# Patient Record
Sex: FEMALE | Race: OTHER | Employment: UNEMPLOYED | URBAN - METROPOLITAN AREA
[De-identification: names, ages, dates, MRNs, and addresses within clinical notes are randomized per-mention and may not be internally consistent; named-entity substitution may affect disease eponyms.]

---

## 2017-05-08 LAB
CHLAMYDIA, EXTERNAL: NORMAL
HBSAG, EXTERNAL: NORMAL
HIV, EXTERNAL: NORMAL
N. GONORRHEA, EXTERNAL: NORMAL
RPR, EXTERNAL: NORMAL
RUBELLA, EXTERNAL: NORMAL

## 2017-11-23 LAB — GRBS, EXTERNAL: NORMAL

## 2017-12-10 ENCOUNTER — HOSPITAL ENCOUNTER (INPATIENT)
Age: 27
LOS: 2 days | Discharge: HOME | End: 2017-12-12
Attending: OBSTETRICS & GYNECOLOGY | Admitting: OBSTETRICS & GYNECOLOGY
Payer: COMMERCIAL

## 2017-12-10 ENCOUNTER — ANESTHESIA EVENT (OUTPATIENT)
Dept: LABOR AND DELIVERY | Age: 27
End: 2017-12-10
Payer: COMMERCIAL

## 2017-12-10 ENCOUNTER — ANESTHESIA (OUTPATIENT)
Dept: LABOR AND DELIVERY | Age: 27
End: 2017-12-10
Payer: COMMERCIAL

## 2017-12-10 PROBLEM — Z34.83 SUPERVISION OF NORMAL IUP (INTRAUTERINE PREGNANCY) IN MULTIGRAVIDA, THIRD TRIMESTER: Status: ACTIVE | Noted: 2017-12-10

## 2017-12-10 LAB
ABO + RH BLD: NORMAL
BASOPHILS # BLD: 0 K/UL (ref 0–0.06)
BASOPHILS NFR BLD: 0 % (ref 0–2)
BLOOD GROUP ANTIBODIES SERPL: NORMAL
DIFFERENTIAL METHOD BLD: ABNORMAL
EOSINOPHIL # BLD: 0.1 K/UL (ref 0–0.4)
EOSINOPHIL NFR BLD: 1 % (ref 0–5)
ERYTHROCYTE [DISTWIDTH] IN BLOOD BY AUTOMATED COUNT: 14.3 % (ref 11.6–14.5)
HCT VFR BLD AUTO: 35.5 % (ref 35–45)
HGB BLD-MCNC: 11.4 G/DL (ref 12–16)
LYMPHOCYTES # BLD: 2.7 K/UL (ref 0.9–3.6)
LYMPHOCYTES NFR BLD: 25 % (ref 21–52)
MCH RBC QN AUTO: 25.6 PG (ref 24–34)
MCHC RBC AUTO-ENTMCNC: 32.1 G/DL (ref 31–37)
MCV RBC AUTO: 79.6 FL (ref 74–97)
MONOCYTES # BLD: 0.9 K/UL (ref 0.05–1.2)
MONOCYTES NFR BLD: 8 % (ref 3–10)
NEUTS SEG # BLD: 7 K/UL (ref 1.8–8)
NEUTS SEG NFR BLD: 66 % (ref 40–73)
PLATELET # BLD AUTO: 304 K/UL (ref 135–420)
PMV BLD AUTO: 8.5 FL (ref 9.2–11.8)
RBC # BLD AUTO: 4.46 M/UL (ref 4.2–5.3)
SPECIMEN EXP DATE BLD: NORMAL
WBC # BLD AUTO: 10.8 K/UL (ref 4.6–13.2)

## 2017-12-10 PROCEDURE — 77030034849

## 2017-12-10 PROCEDURE — 65270000029 HC RM PRIVATE

## 2017-12-10 PROCEDURE — 74011000250 HC RX REV CODE- 250

## 2017-12-10 PROCEDURE — 85025 COMPLETE CBC W/AUTO DIFF WBC: CPT

## 2017-12-10 PROCEDURE — 75410000000 HC DELIVERY VAGINAL/SINGLE

## 2017-12-10 PROCEDURE — 36415 COLL VENOUS BLD VENIPUNCTURE: CPT

## 2017-12-10 PROCEDURE — 76060000078 HC EPIDURAL ANESTHESIA

## 2017-12-10 PROCEDURE — 4A1HXCZ MONITORING OF PRODUCTS OF CONCEPTION, CARDIAC RATE, EXTERNAL APPROACH: ICD-10-PCS | Performed by: ADVANCED PRACTICE MIDWIFE

## 2017-12-10 PROCEDURE — 74011250636 HC RX REV CODE- 250/636: Performed by: ANESTHESIOLOGY

## 2017-12-10 PROCEDURE — 00HU33Z INSERTION OF INFUSION DEVICE INTO SPINAL CANAL, PERCUTANEOUS APPROACH: ICD-10-PCS | Performed by: ANESTHESIOLOGY

## 2017-12-10 PROCEDURE — 86900 BLOOD TYPING SEROLOGIC ABO: CPT

## 2017-12-10 PROCEDURE — 77030007879 HC KT SPN EPDRL TELE -B: Performed by: ANESTHESIOLOGY

## 2017-12-10 PROCEDURE — 74011250636 HC RX REV CODE- 250/636

## 2017-12-10 PROCEDURE — 74011250637 HC RX REV CODE- 250/637: Performed by: ADVANCED PRACTICE MIDWIFE

## 2017-12-10 PROCEDURE — 75410000002 HC LABOR FEE PER 1 HR

## 2017-12-10 PROCEDURE — 74011250636 HC RX REV CODE- 250/636: Performed by: ADVANCED PRACTICE MIDWIFE

## 2017-12-10 PROCEDURE — 75410000003 HC RECOV DEL/VAG/CSECN EA 0.5 HR

## 2017-12-10 RX ORDER — FENTANYL CITRATE 50 UG/ML
100 INJECTION, SOLUTION INTRAMUSCULAR; INTRAVENOUS ONCE
Status: COMPLETED | OUTPATIENT
Start: 2017-12-10 | End: 2017-12-10

## 2017-12-10 RX ORDER — SODIUM CHLORIDE 0.9 % (FLUSH) 0.9 %
5-10 SYRINGE (ML) INJECTION EVERY 8 HOURS
Status: DISCONTINUED | OUTPATIENT
Start: 2017-12-10 | End: 2017-12-10 | Stop reason: HOSPADM

## 2017-12-10 RX ORDER — BUTORPHANOL TARTRATE 2 MG/ML
2 INJECTION INTRAMUSCULAR; INTRAVENOUS
Status: DISCONTINUED | OUTPATIENT
Start: 2017-12-10 | End: 2017-12-10 | Stop reason: HOSPADM

## 2017-12-10 RX ORDER — NALBUPHINE HYDROCHLORIDE 10 MG/ML
10 INJECTION, SOLUTION INTRAMUSCULAR; INTRAVENOUS; SUBCUTANEOUS
Status: DISCONTINUED | OUTPATIENT
Start: 2017-12-10 | End: 2017-12-10 | Stop reason: HOSPADM

## 2017-12-10 RX ORDER — SODIUM CHLORIDE 0.9 % (FLUSH) 0.9 %
5-10 SYRINGE (ML) INJECTION AS NEEDED
Status: DISCONTINUED | OUTPATIENT
Start: 2017-12-10 | End: 2017-12-10 | Stop reason: HOSPADM

## 2017-12-10 RX ORDER — AMOXICILLIN 250 MG
1 CAPSULE ORAL
Status: DISCONTINUED | OUTPATIENT
Start: 2017-12-10 | End: 2017-12-12 | Stop reason: HOSPADM

## 2017-12-10 RX ORDER — OXYTOCIN/RINGER'S LACTATE 20/1000 ML
500 PLASTIC BAG, INJECTION (ML) INTRAVENOUS ONCE
Status: COMPLETED | OUTPATIENT
Start: 2017-12-10 | End: 2017-12-10

## 2017-12-10 RX ORDER — SODIUM CHLORIDE, SODIUM LACTATE, POTASSIUM CHLORIDE, CALCIUM CHLORIDE 600; 310; 30; 20 MG/100ML; MG/100ML; MG/100ML; MG/100ML
125 INJECTION, SOLUTION INTRAVENOUS CONTINUOUS
Status: DISCONTINUED | OUTPATIENT
Start: 2017-12-10 | End: 2017-12-10 | Stop reason: HOSPADM

## 2017-12-10 RX ORDER — EPHEDRINE SULFATE/0.9% NACL/PF 25 MG/5 ML
10 SYRINGE (ML) INTRAVENOUS AS NEEDED
Status: DISCONTINUED | OUTPATIENT
Start: 2017-12-10 | End: 2017-12-10 | Stop reason: HOSPADM

## 2017-12-10 RX ORDER — LIDOCAINE HYDROCHLORIDE 10 MG/ML
20 INJECTION, SOLUTION EPIDURAL; INFILTRATION; INTRACAUDAL; PERINEURAL AS NEEDED
Status: DISCONTINUED | OUTPATIENT
Start: 2017-12-10 | End: 2017-12-10 | Stop reason: HOSPADM

## 2017-12-10 RX ORDER — HYDROMORPHONE HYDROCHLORIDE 2 MG/ML
1 INJECTION, SOLUTION INTRAMUSCULAR; INTRAVENOUS; SUBCUTANEOUS
Status: DISCONTINUED | OUTPATIENT
Start: 2017-12-10 | End: 2017-12-10 | Stop reason: HOSPADM

## 2017-12-10 RX ORDER — ZOLPIDEM TARTRATE 5 MG/1
5 TABLET ORAL
Status: DISCONTINUED | OUTPATIENT
Start: 2017-12-10 | End: 2017-12-12 | Stop reason: HOSPADM

## 2017-12-10 RX ORDER — ACETAMINOPHEN 325 MG/1
650 TABLET ORAL
Status: DISCONTINUED | OUTPATIENT
Start: 2017-12-10 | End: 2017-12-12 | Stop reason: HOSPADM

## 2017-12-10 RX ORDER — FENTANYL/ROPIVACAINE/NS/PF 2MCG/ML-.1
1-15 PLASTIC BAG, INJECTION (ML) EPIDURAL
Status: DISCONTINUED | OUTPATIENT
Start: 2017-12-10 | End: 2017-12-10 | Stop reason: HOSPADM

## 2017-12-10 RX ORDER — TERBUTALINE SULFATE 1 MG/ML
0.25 INJECTION SUBCUTANEOUS
Status: DISCONTINUED | OUTPATIENT
Start: 2017-12-10 | End: 2017-12-10 | Stop reason: HOSPADM

## 2017-12-10 RX ORDER — OXYCODONE AND ACETAMINOPHEN 5; 325 MG/1; MG/1
2 TABLET ORAL
Status: DISCONTINUED | OUTPATIENT
Start: 2017-12-10 | End: 2017-12-12 | Stop reason: HOSPADM

## 2017-12-10 RX ORDER — LIDOCAINE HYDROCHLORIDE 10 MG/ML
INJECTION INFILTRATION; PERINEURAL
Status: DISPENSED
Start: 2017-12-10 | End: 2017-12-11

## 2017-12-10 RX ORDER — NALOXONE HYDROCHLORIDE 0.4 MG/ML
0.2 INJECTION, SOLUTION INTRAMUSCULAR; INTRAVENOUS; SUBCUTANEOUS AS NEEDED
Status: DISCONTINUED | OUTPATIENT
Start: 2017-12-10 | End: 2017-12-10 | Stop reason: HOSPADM

## 2017-12-10 RX ORDER — ROPIVACAINE HYDROCHLORIDE 2 MG/ML
INJECTION, SOLUTION EPIDURAL; INFILTRATION; PERINEURAL AS NEEDED
Status: DISCONTINUED | OUTPATIENT
Start: 2017-12-10 | End: 2017-12-10 | Stop reason: HOSPADM

## 2017-12-10 RX ORDER — OXYTOCIN/RINGER'S LACTATE 20/1000 ML
125 PLASTIC BAG, INJECTION (ML) INTRAVENOUS CONTINUOUS
Status: DISCONTINUED | OUTPATIENT
Start: 2017-12-10 | End: 2017-12-10 | Stop reason: HOSPADM

## 2017-12-10 RX ORDER — METHYLERGONOVINE MALEATE 0.2 MG/ML
0.2 INJECTION INTRAVENOUS AS NEEDED
Status: DISCONTINUED | OUTPATIENT
Start: 2017-12-10 | End: 2017-12-10 | Stop reason: HOSPADM

## 2017-12-10 RX ORDER — MINERAL OIL
30 OIL (ML) ORAL AS NEEDED
Status: DISCONTINUED | OUTPATIENT
Start: 2017-12-10 | End: 2017-12-10 | Stop reason: HOSPADM

## 2017-12-10 RX ORDER — DIPHENHYDRAMINE HYDROCHLORIDE 50 MG/ML
25 INJECTION, SOLUTION INTRAMUSCULAR; INTRAVENOUS
Status: DISCONTINUED | OUTPATIENT
Start: 2017-12-10 | End: 2017-12-10 | Stop reason: HOSPADM

## 2017-12-10 RX ORDER — LIDOCAINE HYDROCHLORIDE AND EPINEPHRINE 15; 5 MG/ML; UG/ML
INJECTION, SOLUTION EPIDURAL AS NEEDED
Status: DISCONTINUED | OUTPATIENT
Start: 2017-12-10 | End: 2017-12-10 | Stop reason: HOSPADM

## 2017-12-10 RX ORDER — PROMETHAZINE HYDROCHLORIDE 25 MG/ML
25 INJECTION, SOLUTION INTRAMUSCULAR; INTRAVENOUS
Status: DISCONTINUED | OUTPATIENT
Start: 2017-12-10 | End: 2017-12-12 | Stop reason: HOSPADM

## 2017-12-10 RX ORDER — FENTANYL CITRATE 50 UG/ML
INJECTION, SOLUTION INTRAMUSCULAR; INTRAVENOUS AS NEEDED
Status: DISCONTINUED | OUTPATIENT
Start: 2017-12-10 | End: 2017-12-10 | Stop reason: HOSPADM

## 2017-12-10 RX ADMIN — Medication 10000 MILLI-UNITS/HR: at 17:24

## 2017-12-10 RX ADMIN — ROPIVACAINE HYDROCHLORIDE 3 ML: 2 INJECTION, SOLUTION EPIDURAL; INFILTRATION; PERINEURAL at 12:53

## 2017-12-10 RX ADMIN — ACETAMINOPHEN 650 MG: 325 TABLET ORAL at 19:40

## 2017-12-10 RX ADMIN — FENTANYL CITRATE 100 MCG: 50 INJECTION, SOLUTION INTRAMUSCULAR; INTRAVENOUS at 12:00

## 2017-12-10 RX ADMIN — SODIUM CHLORIDE, SODIUM LACTATE, POTASSIUM CHLORIDE, AND CALCIUM CHLORIDE 125 ML/HR: 600; 310; 30; 20 INJECTION, SOLUTION INTRAVENOUS at 12:34

## 2017-12-10 RX ADMIN — ROPIVACAINE HYDROCHLORIDE 10 ML/HR: 10 INJECTION, SOLUTION EPIDURAL at 12:28

## 2017-12-10 RX ADMIN — ROPIVACAINE HYDROCHLORIDE 3 ML: 2 INJECTION, SOLUTION EPIDURAL; INFILTRATION; PERINEURAL at 12:52

## 2017-12-10 RX ADMIN — SODIUM CHLORIDE, SODIUM LACTATE, POTASSIUM CHLORIDE, AND CALCIUM CHLORIDE 125 ML/HR: 600; 310; 30; 20 INJECTION, SOLUTION INTRAVENOUS at 10:20

## 2017-12-10 RX ADMIN — LIDOCAINE HYDROCHLORIDE AND EPINEPHRINE 3 ML: 15; 5 INJECTION, SOLUTION EPIDURAL at 12:05

## 2017-12-10 RX ADMIN — ROPIVACAINE HYDROCHLORIDE 3 ML: 2 INJECTION, SOLUTION EPIDURAL; INFILTRATION; PERINEURAL at 12:08

## 2017-12-10 RX ADMIN — Medication 125 ML/HR: at 18:54

## 2017-12-10 RX ADMIN — ROPIVACAINE HYDROCHLORIDE 4 ML: 2 INJECTION, SOLUTION EPIDURAL; INFILTRATION; PERINEURAL at 12:09

## 2017-12-10 RX ADMIN — ROPIVACAINE HYDROCHLORIDE 3 ML: 2 INJECTION, SOLUTION EPIDURAL; INFILTRATION; PERINEURAL at 12:07

## 2017-12-10 RX ADMIN — FENTANYL CITRATE 100 MCG: 50 INJECTION, SOLUTION INTRAMUSCULAR; INTRAVENOUS at 12:09

## 2017-12-10 NOTE — L&D DELIVERY NOTE
Delivery Note    Obstetrician:  Marisabel Carvajal CNM    Assistant: none    Pre-Delivery Diagnosis: Term pregnancy, Spontaneous labor or Single fetus    Post-Delivery Diagnosis: Living  infant(s) or Female    Intrapartum Event: None    Procedure: Spontaneous vaginal delivery    Epidural: YES    Monitor:  Fetal Heart Tones - External and Uterine Contractions - External    Indications for instrumental delivery: none    Estimated Blood Loss: 150    Episiotomy: none    Laceration(s):  none    Laceration(s) repair: NO    Presentation: Cephalic    Fetal Description: coyne    Fetal Position: Occiput Anterior    Birth Weight: not yet assessed    Birth Length: not yet assessed    Apgar - One Minute: 9    Apgar - Five Minutes: 9    Umbilical Cord: 3 vessels present and Cord blood sent to lab for type, Rh, and Nini' test    Specimens: placenta delivered intact           Complications:  none           Cord Blood Results:   Information for the patient's :  Vida Trevino [[id number]]   No results found for: PCTABR, PCTDIG, BILI, ABORH    Prenatal Labs:     Lab Results   Component Value Date/Time    ABO/Rh(D) O POSITIVE 12/10/2017 10:15 AM    HBsAg, External neg 2017    HIV, External NR 2017    Rubella, External immune 2017    RPR, External NR 2017    Gonorrhea, External neg 2017    Chlamydia, External neg 2017    GrBStrep, External neg 2017        Attending Attestation: I was present and scrubbed for the entire procedure    Signed By:  Marisabel Carvajal CNM     December 10, 2017

## 2017-12-10 NOTE — ANESTHESIA PROCEDURE NOTES
Epidural Block    Start time: 12/10/2017 11:46 AM  Performed by: Lara Claros by: Simone Awad     Pre-Procedure  Indication: labor epidural    Preanesthetic Checklist: patient identified, risks and benefits discussed, anesthesia consent, site marked, patient being monitored, timeout performed and anesthesia consent    Timeout Time: 11:51        Epidural:   Patient position:  Seated  Prep region:  Lumbar  Prep: Chlorhexidine    Location:  L4-5    Needle and Epidural Catheter:   Needle Type:  Tuohy  Needle Gauge:  17 G  Injection Technique:  Loss of resistance using saline  Attempts:  1  Catheter Size:  20 G  Catheter at Skin Depth (cm):  12  Depth in Epidural Space (cm):  7  Events: no blood with aspiration, no cerebrospinal fluid with aspiration, no paresthesia and negative aspiration test    Test Dose:  Negative and lidocaine 1.5% w/ epi    Assessment:   Catheter Secured:  Tegaderm and tape  Insertion:  Uncomplicated  Patient tolerance:  Patient tolerated the procedure well with no immediate complications  While pt sitting and prepped for epidural, PIV noted to be infiltrated. Procedure paused for placement on new PIV. Pt re-prepped/draped.

## 2017-12-10 NOTE — IP AVS SNAPSHOT
[address] [address] 
[phone] Patient: Terrence Watt MRN: [id number] :1990 About your hospitalization You were admitted on:  December 10, 2017 You last received care in the:  [address] [place] [address] You were discharged on:  2017 Why you were hospitalized Your primary diagnosis was:  Not on File Your diagnoses also included:  Supervision Of Normal Iup (Intrauterine Pregnancy) In Multigravida, Third Trimester Things You Need To Do (next 8 weeks) Follow up with Candace Whitman MD  
  
Phone:  [phone] Where:  [address], [address] [facility], [address] [place] [address] Follow up with Dave Rossi MD in 6 week(s) As needed for postpartum visit. Phone:  [phone] Where:  [facility], [facility], [facility] [place] [id number] Discharge Orders None A check debra indicates which time of day the medication should be taken. My Medications STOP taking these medications HYDROcodone-acetaminophen 5-325 mg per tablet Commonly known as:  [address] these medications as instructed Instructions Each Dose to Equal  
 Morning Noon Evening Bedtime  
 oxyCODONE-acetaminophen 5-325 mg per tablet Commonly known as:  PERCOCET Your last dose was: Your next dose is: Take 1 Tab by mouth every six (6) hours as needed. Max Daily Amount: 4 Tabs. Indications: Pain 1 Tab PNV with Ca No.65-Iron Poly-FA 60 mg iron-1 mg Cap Your last dose was: Your next dose is: Take 1 Tab by mouth daily. Indications: PREGNANCY  
 1 Tab Where to Get Your Medications Information on where to get these meds will be given to you by the nurse or doctor. ! Ask your nurse or doctor about these medications oxyCODONE-acetaminophen 5-325 mg per tablet Discharge Instructions POST DELIVERY DISCHARGE INSTRUCTIONS Name: Roxianne Moritz YOB: 1990 Primary Diagnosis: Active Problems: * No active hospital problems. * General:  
 
Diet/Diet Restrictions: 
Eight 8-ounce glasses of fluid daily (water, juices); avoid excessive caffeine intake. Meals/snacks as desired which are high in fiber and carbohydrates and low in fat and cholesterol. Physical Activity / Restrictions / Safety:  
 
Avoid heavy lifting, no more that 8 lbs. For 2-3 weeks; limit use of stairs to 2 times daily for the first week home. No driving for one week. Avoid intercourse 4-6 weeks, no douching or tampon use. Check with obstetrician before starting or resuming an exercise program.    
 
 
Discharge Instructions/Special Treatment/Home Care Needs:  
 
Continue prenatal vitamins. Continue to use squirt bottle with warm water on your episiotomy after each bathroom use until bleeding stops. If steri-strips applied to your incision, remove in 7-10 days. Call your doctor for the following:  
 
Fever over 101 degrees by mouth. Vaginal bleeding heavier than a normal menstrual period or clot larger than a golf ball. Red streaks or increased swelling of legs, painful red streaks on your breast. 
Painful urination, constipation and increased pain or swelling or discharge with your incision. If you feel extremely anxious or overwhelmed. If you have thoughts of harming yourself and/or your baby. Pain Management:  
 
Pain Management:  
Take Acetaminophen (Tylenol) or Ibuprofen (Advil, Motrin), as directed for pain. Use a warm Sitz bath 3 times daily to relieve episiotomy or hemorrhoidal discomfort. Heating pad to  incision as needed. For hemorrhoidal discomfort, use Tucks and Anusol cream as needed and directed. Follow-Up Care: These are general instructions for a healthy lifestyle: No smoking/ No tobacco products/ Avoid exposure to second hand smoke Surgeon General's Warning:  Quitting smoking now greatly reduces serious risk to your health. Obesity, smoking, and sedentary lifestyle greatly increases your risk for illness A healthy diet, regular physical exercise & weight monitoring are important for maintaining a healthy lifestyle Recognize signs and symptoms of STROKE: 
 
F-face looks uneven A-arms unable to move or move unevenly S-speech slurred or non-existent T-time-call 911 as soon as signs and symptoms begin-DO NOT go Back to bed or wait to see if you get better-TIME IS BRAIN. MyChart Activation Thank you for requesting access to [employer]. Please follow the instructions below to securely access and download your online medical record. [employer] allows you to send messages to your doctor, view your test results, renew your prescriptions, schedule appointments, and more. How Do I Sign Up? 1. In your internet browser, go to [email or web address]. 2. Click on the First Time User? Click Here link in the Sign In box. You will see the New Member Sign Up page. 3. Enter your [employer] Access Code exactly as it appears below. You will not need to use this code after youve completed the sign-up process. If you do not sign up before the expiration date, you must request a new code. [employer] Access Code: [email or web address] Expires: 3/6/2018  1:27 PM (This is the date your [employer] access code will ) 4. Enter the last four digits of your Social Security Number (xxxx) and Date of Birth (mm/dd/yyyy) as indicated and click Submit. You will be taken to the next sign-up page. 5. Create a [employer] ID. This will be your [employer] login ID and cannot be changed, so think of one that is secure and easy to remember. 6. Create a [employer] password. You can change your password at any time. 7. Enter your Password Reset Question and Answer. This can be used at a later time if you forget your password. 8. Enter your e-mail address. You will receive e-mail notification when new information is available in [address] 9. Click Sign Up. You can now view and download portions of your medical record. 10. Click the Download Summary menu link to download a portable copy of your medical information. Additional Information If you have questions, please visit the Frequently Asked Questions section of the [employer] website at [email or web address] Remember, [employer] is NOT to be used for urgent needs. For medical emergencies, dial 911. Patient armband removed and given to patient to take home. Patient was informed of the privacy risks if armband lost or stolen Signed By: Mark Pink RN                                                                                                   Date: 12/12/2017 Time: 9:48 AM 
 
 
 
  
  
  
Introducing \[facility]\"" & HEALTH SERVICES! [facility] [facility] introduces [employer] patient portal. Now you can access parts of your medical record, email your doctor's office, and request medication refills online. 1. In your internet browser, go to [email or web address] 2. Click on the First Time User? Click Here link in the Sign In box. You will see the New Member Sign Up page. 3. Enter your [employer] Access Code exactly as it appears below. You will not need to use this code after youve completed the sign-up process. If you do not sign up before the expiration date, you must request a new code. · [employer] Access Code: [email or web address] Expires: 3/6/2018  1:27 PM 
 
4. Enter the last four digits of your Social Security Number (xxxx) and Date of Birth (mm/dd/yyyy) as indicated and click Submit. You will be taken to the next sign-up page. 5. Create a [employer] ID.  This will be your [employer] login ID and cannot be changed, so think of one that is secure and easy to remember. 6. Create a [employer] password. You can change your password at any time. 7. Enter your Password Reset Question and Answer. This can be used at a later time if you forget your password. 8. Enter your e-mail address. You will receive e-mail notification when new information is available in [address] 9. Click Sign Up. You can now view and download portions of your medical record. 10. Click the Download Summary menu link to download a portable copy of your medical information. If you have questions, please visit the Frequently Asked Questions section of the [employer] website. Remember, [employer] is NOT to be used for urgent needs. For medical emergencies, dial 911. Now available from your iPhone and Android! Providers Seen During Your Hospitalization Provider Specialty Primary office phone Irma Aiken MD Obstetrics & Gynecology [phone] Your Primary Care Physician (PCP) Primary Care Physician Office Phone Office Fax Marlene Dallas [phone] You are allergic to the following Allergen Reactions Ibuprofen Swelling Recent Documentation Height Weight Breastfeeding? BMI OB Status Smoking Status 1.575 m 87.1 kg Unknown 35.12 kg/m2 Recent pregnancy Never Smoker Emergency Contacts Name Discharge Info Relation Home Work Mobile Maya Jaurez DISCHARGE CAREGIVER [3] Parent [1] [phone] Rashawn Bradshaw DISCHARGE CAREGIVER [3] Spouse [3]   [phone] Patient Belongings The following personal items are in your possession at time of discharge: 
  Dental Appliances: None         Home Medications: None   Jewelry: Necklace, Earrings  Clothing: At bedside    Other Valuables: None Please provide this summary of care documentation to your next provider.  
  
  
 
  
Signatures-by signing, you are acknowledging that this After Visit Summary has been reviewed with you and you have received a copy. Patient Signature:  ____________________________________________________________ Date:  ____________________________________________________________  
  
Faith Feller Provider Signature:  ____________________________________________________________ Date:  ____________________________________________________________

## 2017-12-10 NOTE — IP AVS SNAPSHOT
[address] [address] 
[phone] Patient: oMira Gamboa MRN: [id number] :1990 My Medications STOP taking these medications HYDROcodone-acetaminophen 5-325 mg per tablet Commonly known as:  [address] these medications as instructed Instructions Each Dose to Equal  
 Morning Noon Evening Bedtime  
 oxyCODONE-acetaminophen 5-325 mg per tablet Commonly known as:  PERCOCET Your last dose was: Your next dose is: Take 1 Tab by mouth every six (6) hours as needed. Max Daily Amount: 4 Tabs. Indications: Pain 1 Tab PNV with Ca No.65-Iron Poly-FA 60 mg iron-1 mg Cap Your last dose was: Your next dose is: Take 1 Tab by mouth daily. Indications: PREGNANCY  
 1 Tab Where to Get Your Medications Information on where to get these meds will be given to you by the nurse or doctor. ! Ask your nurse or doctor about these medications  
  oxyCODONE-acetaminophen 5-325 mg per tablet

## 2017-12-10 NOTE — ANESTHESIA PREPROCEDURE EVALUATION
Anesthetic History   No history of anesthetic complications            Review of Systems / Medical History  Patient summary reviewed, nursing notes reviewed and pertinent labs reviewed    Pulmonary  Within defined limits                 Neuro/Psych   Within defined limits           Cardiovascular  Within defined limits                Exercise tolerance: >4 METS     GI/Hepatic/Renal  Within defined limits              Endo/Other  Within defined limits           Other Findings              Physical Exam    Airway  Mallampati: II  TM Distance: 4 - 6 cm  Neck ROM: normal range of motion   Mouth opening: Normal     Cardiovascular               Dental         Pulmonary                 Abdominal         Other Findings            Anesthetic Plan    ASA: 2  Anesthesia type: epidural            Anesthetic plan and risks discussed with: Patient      Risks of epidural, including but not limited to bleeding, infection, back pain, headache, seizure, nerve injury, and block failure discussed with patient and accepted.

## 2017-12-10 NOTE — PROGRESS NOTES
0930 M. EDITH Martinez at bedside. Nitrazine positive. SVE:5/80/-2    [id number] Pt reports she felt a gush of fluid at 0745 this morning. Cydney every 5-7 minutes. 1020 Pt admitted to room 1.     1030 Pt oriented to room. Given call leary. [address] [place] Elder Pérez paged. 1120 Dr. Juan Francisco Pérez returned call. 1150 Dr. Juan Francisco Pérez at bedside explaining epidural procedure, side effects, risks, benefits, and positioning. Patient verbalizes understanding. Time-out: 1151  Procedure start: 1153  Catheter in, needle out: 1203  Test dose: 1205  Fentanyl vial handed to MD at bedside. Loading dose: 5232  Patient connected to pump: 1228    1152 IV infiltrated with bolus running. [address] [address] Second IV infiltrated with bolus running    1234 New IV started. Running well with maintains fluids. Pt reported no pain relief on left side. Pt tilted and pt bolus administered. 1240 Reports still no pain relief on left side. [address] [facility] [address]. Juan Francisco Pérez paged    [phone] Dr. Juan Francisco Pérez returned call. Asked to call CRNA to bolus pt. CRNA paged. 1250 CRNA at bedside. Bolus given. 1500 [facility] EDITH Martinez SVE:8/100/0    1300 [place] placed. Pt turned to left side. US and TOCO adjusted. [address] [facility] [address] lip/100/+1    1434 M. EDITH Martinez at bedside. Complete. Pt turned to right lateral with peanutball in place. US and TOCO adjusted. 1520 Positioned in chair position. US and TOCO adjusted. 1545 Pt tilted to left side in chair position. [address] and CNM at bedside monitoring [address] until delivery. 1720 start pushing    1720 Delivery of viable female infant. Vigorous cry noted with tactile stimulation. Grandmother cut cord. Infant placed skin to skin on mothers chest.     1729 Delivery of intact placenta. 1745 Ileana-care, Ice-pack, and pad provided. Pt breast feeding infant. [address] provided. Pt denies needs at this time. 1900 Pad and ice-pack changed. 1920 Bedside shift change report given to MOOSE Bates (oncoming nurse) by Deysi Del Rio RN  (offgoing nurse). Report included the following information SBAR, Kardex, Intake/Output and MAR.

## 2017-12-11 PROBLEM — Z34.83 SUPERVISION OF NORMAL IUP (INTRAUTERINE PREGNANCY) IN MULTIGRAVIDA, THIRD TRIMESTER: Status: RESOLVED | Noted: 2017-12-10 | Resolved: 2017-12-11

## 2017-12-11 LAB
HCT VFR BLD AUTO: 33 % (ref 35–45)
HGB BLD-MCNC: 10.9 G/DL (ref 12–16)

## 2017-12-11 PROCEDURE — 74011250637 HC RX REV CODE- 250/637: Performed by: ADVANCED PRACTICE MIDWIFE

## 2017-12-11 PROCEDURE — 85018 HEMOGLOBIN: CPT | Performed by: OBSTETRICS & GYNECOLOGY

## 2017-12-11 PROCEDURE — 36415 COLL VENOUS BLD VENIPUNCTURE: CPT | Performed by: OBSTETRICS & GYNECOLOGY

## 2017-12-11 PROCEDURE — 85014 HEMATOCRIT: CPT | Performed by: OBSTETRICS & GYNECOLOGY

## 2017-12-11 PROCEDURE — 65270000029 HC RM PRIVATE

## 2017-12-11 RX ORDER — OXYCODONE AND ACETAMINOPHEN 5; 325 MG/1; MG/1
1 TABLET ORAL
Qty: 20 TAB | Refills: 0 | Status: SHIPPED | OUTPATIENT
Start: 2017-12-11

## 2017-12-11 RX ADMIN — OXYCODONE HYDROCHLORIDE AND ACETAMINOPHEN 2 TABLET: 5; 325 TABLET ORAL at 05:18

## 2017-12-11 RX ADMIN — ACETAMINOPHEN 650 MG: 325 TABLET ORAL at 15:17

## 2017-12-11 RX ADMIN — DOCUSATE SODIUM AND SENNOSIDES 1 TABLET: 8.6; 5 TABLET, FILM COATED ORAL at 21:02

## 2017-12-11 NOTE — ANESTHESIA POSTPROCEDURE EVALUATION
12/11/2017  8:44 AM    Laboring Epidural Follow-up Note     Referring physician: Nevaeh Becerra MD   Patient status post vaginal delivery with labor epidural    Visit Vitals    /77 (BP 1 Location: Right arm, BP Patient Position: At rest)    Pulse 71    Temp 37 °C (98.6 °F)    Resp 16    Ht 5' 2\" (1.575 m)    Wt 87.1 kg (192 lb)    SpO2 99%    Breastfeeding Unknown    BMI 35.12 kg/m2       Epidural removed by L&D staff  No sedation, pruritis noted. Adequate analgesia.   No obvious anesthesia complications          Madeline Mukherjee CRNA

## 2017-12-11 NOTE — PROGRESS NOTES
Post-Partum Day Number 1 Progress Note    Judy Pound     Assessment: Spontaneous Vaginal delivery,Doing well, post partum day 1    Plan:  1. Continue routine postpartum and perineal care as well as maternal education. 2. Support maternal/infant bonding  3. Encourage advance in diet and ambulation as tolerated. 4. Plan for discharge home tomorrow. Information for the patient's :  Jose Linton [[id number]]   Vaginal, Spontaneous Delivery   Patient doing well without significant complaint.VSS. Afebrile. She is bottle-feeding and bonding well with infant. She is  Voiding and ambulating without difficulty, reports normal lochia, no clots. She is receiving adequate pain control with current prescribed medication. Current Facility-Administered Medications   Medication Dose Route Frequency       Vitals:  Visit Vitals    /77 (BP 1 Location: Right arm, BP Patient Position: At rest)    Pulse 71    Temp 98.6 °F (37 °C)    Resp 16    Ht 5' 2\" (1.575 m)    Wt 87.1 kg (192 lb)    SpO2 99%    Breastfeeding Unknown    BMI 35.12 kg/m2     Temp (24hrs), Av °F (37.2 °C), Min:98.3 °F (36.8 °C), Max:100.2 °F (37.9 °C)        Exam:   Patient without distress. Abdomen soft, nontender, fundus firm at umbilicus. Perineum with normal lochia noted, no clots. Lower extremities are negative for swelling, cords or tenderness. No signs or symptoms of DVT on PE    Labs: All labs reviewed from last 24 hours.

## 2017-12-11 NOTE — PROGRESS NOTES
1915 - Bedside report received from [facility] - Assessment complete. 2015 - Patient ambulated to bathroom with minimal assistance. Voided large amount. Ileana care performed, pad and gown changed. Patient assisted back to bed. Tolerated well. 2050 - Patient transferred to postpartum unit. Report given to ROSEANNE Matta RN for continued progression of care.

## 2017-12-11 NOTE — ROUTINE PROCESS
Bedside and Verbal shift change report given to BARBARA Silverio RN  by Shadi Sweet RN . Report given with Elton WALLER and MAR.

## 2017-12-11 NOTE — DISCHARGE SUMMARY
Obstetrical Discharge Summary     Name: Ok Bloch MRN: [id number]  SSN: [id number]    YOB: 1990  Age: 33 y.o. Sex: female      Admit Date: 12/10/2017    Discharge Date: 2017    Admitting Physician: Artemio Vanessa MD     Attending Physician:  Artemio Vanessa MD     Discharge Diagnoses:   Information for the patient's :  Marychuy Novoa [[id number]]   Delivery of a 3.204 kg female infant via Vaginal, Spontaneous Delivery on 12/10/2017 at 5:20 PM  by . Apgars were 9 and 9. Additional Diagnoses:   Problem List as of 2017  Date Reviewed: 2017          Codes Class Noted - Resolved    Postpartum care following vaginal delivery ICD-10-CM: Z39.2  ICD-9-CM: V24.2  2015 - Present        RESOLVED: Supervision of normal IUP (intrauterine pregnancy) in multigravida, third trimester ICD-10-CM: Z34.83  ICD-9-CM: V22.1  12/10/2017 - 2017        RESOLVED: IUP (intrauterine pregnancy), incidental ICD-10-CM: Z34.90  ICD-9-CM: V22.2  2015 - 2015        RESOLVED: Supervision of other normal pregnancy (Chronic) ICD-10-CM: Z34.80  ICD-9-CM: V22.1  2015 - 2015              Lab Results   Component Value Date/Time    Rubella, External immune 2017    GrBStrep, External neg 2017     Recent Labs      17   0420   HGB  10.9*       Hospital Course: Normal hospital course following the delivery. Patient Instructions:   Current Discharge Medication List      START taking these medications    Details   oxyCODONE-acetaminophen (PERCOCET) 5-325 mg per tablet Take 1 Tab by mouth every six (6) hours as needed. Max Daily Amount: 4 Tabs. Indications: Pain  Qty: 20 Tab, Refills: 0         CONTINUE these medications which have NOT CHANGED    Details   PNV with Ca No.65-Iron Poly-FA 60 mg iron-1 mg cap Take 1 Tab by mouth daily.  Indications: PREGNANCY         STOP taking these medications       HYDROcodone-acetaminophen (NORCO) 5-325 mg per tablet Comments:   Reason for Stopping:               Reference my discharge instructions. Follow-up Appointments   Procedures    FOLLOW UP VISIT Appointment in: 6 Weeks Follow-up in office in 6 weeks for postpartum visit. Follow-up in office in 6 weeks for postpartum visit.      Standing Status:   Standing     Number of Occurrences:   1     Order Specific Question:   Appointment in     Answer:   6 Weeks        Signed By:  Shaji Medina CNM     December 11, 2017                       BST

## 2017-12-11 NOTE — PROGRESS NOTES
Bedside and Verbal shift change report given to Turner Navarro RN (oncoming nurse) by ROSEANNE Matta RN (offgoing nurse). Report included the following information SBAR, Kardex, Intake/Output, MAR and Recent Results.

## 2017-12-12 VITALS
TEMPERATURE: 98.3 F | HEART RATE: 64 BPM | DIASTOLIC BLOOD PRESSURE: 78 MMHG | OXYGEN SATURATION: 99 % | WEIGHT: 192 LBS | SYSTOLIC BLOOD PRESSURE: 132 MMHG | HEIGHT: 62 IN | BODY MASS INDEX: 35.33 KG/M2 | RESPIRATION RATE: 16 BRPM

## 2017-12-12 PROCEDURE — 74011250637 HC RX REV CODE- 250/637: Performed by: ADVANCED PRACTICE MIDWIFE

## 2017-12-12 RX ADMIN — ACETAMINOPHEN 650 MG: 325 TABLET ORAL at 03:17

## 2017-12-12 RX ADMIN — ACETAMINOPHEN 650 MG: 325 TABLET ORAL at 07:47

## 2017-12-12 NOTE — DISCHARGE INSTRUCTIONS
POST DELIVERY DISCHARGE INSTRUCTIONS    Name: Armand Sandy  YOB: 1990  Primary Diagnosis: Active Problems:    * No active hospital problems. *      General:     Diet/Diet Restrictions:  Eight 8-ounce glasses of fluid daily (water, juices); avoid excessive caffeine intake. Meals/snacks as desired which are high in fiber and carbohydrates and low in fat and cholesterol. Physical Activity / Restrictions / Safety:     Avoid heavy lifting, no more that 8 lbs. For 2-3 weeks; limit use of stairs to 2 times daily for the first week home. No driving for one week. Avoid intercourse 4-6 weeks, no douching or tampon use. Check with obstetrician before starting or resuming an exercise program.         Discharge Instructions/Special Treatment/Home Care Needs:     Continue prenatal vitamins. Continue to use squirt bottle with warm water on your episiotomy after each bathroom use until bleeding stops. If steri-strips applied to your incision, remove in 7-10 days. Call your doctor for the following:     Fever over 101 degrees by mouth. Vaginal bleeding heavier than a normal menstrual period or clot larger than a golf ball. Red streaks or increased swelling of legs, painful red streaks on your breast.  Painful urination, constipation and increased pain or swelling or discharge with your incision. If you feel extremely anxious or overwhelmed. If you have thoughts of harming yourself and/or your baby. Pain Management:     Pain Management:   Take Acetaminophen (Tylenol) or Ibuprofen (Advil, Motrin), as directed for pain. Use a warm Sitz bath 3 times daily to relieve episiotomy or hemorrhoidal discomfort. Heating pad to  incision as needed. For hemorrhoidal discomfort, use Tucks and Anusol cream as needed and directed. Follow-Up Care:      These are general instructions for a healthy lifestyle:    No smoking/ No tobacco products/ Avoid exposure to second hand smoke    Surgeon General's Warning:  Quitting smoking now greatly reduces serious risk to your health. Obesity, smoking, and sedentary lifestyle greatly increases your risk for illness    A healthy diet, regular physical exercise & weight monitoring are important for maintaining a healthy lifestyle    Recognize signs and symptoms of STROKE:    F-face looks uneven    A-arms unable to move or move unevenly    S-speech slurred or non-existent    T-time-call 911 as soon as signs and symptoms begin-DO NOT go       Back to bed or wait to see if you get better-TIME IS BRAIN. MyChart Activation    Thank you for requesting access to [employer]. Please follow the instructions below to securely access and download your online medical record. [employer] allows you to send messages to your doctor, view your test results, renew your prescriptions, schedule appointments, and more. How Do I Sign Up? 1. In your internet browser, go to [email or web address] 2. Click on the First Time User? Click Here link in the Sign In box. You will see the New Member Sign Up page. 3. Enter your [employer] Access Code exactly as it appears below. You will not need to use this code after youve completed the sign-up process. If you do not sign up before the expiration date, you must request a new code. [employer] Access Code: [email or web address]  Expires: 3/6/2018  1:27 PM (This is the date your [employer] access code will )    4. Enter the last four digits of your Social Security Number (xxxx) and Date of Birth (mm/dd/yyyy) as indicated and click Submit. You will be taken to the next sign-up page. 5. Create a [employer] ID. This will be your [employer] login ID and cannot be changed, so think of one that is secure and easy to remember. 6. Create a [employer] password. You can change your password at any time. 7. Enter your Password Reset Question and Answer. This can be used at a later time if you forget your password.    8. Enter your e-mail address. You will receive e-mail notification when new information is available in [address] 9. Click Sign Up. You can now view and download portions of your medical record. 10. Click the Download Summary menu link to download a portable copy of your medical information. Additional Information    If you have questions, please visit the Frequently Asked Questions section of the [employer] website at [email or web address] Remember, [employer] is NOT to be used for urgent needs. For medical emergencies, dial 911. Patient armband removed and given to patient to take home.   Patient was informed of the privacy risks if armband lost or stolen      Signed By: David Guevara RN                                                                                                   Date: 12/12/2017 Time: 9:48 AM

## 2017-12-12 NOTE — PROGRESS NOTES
Patient discharged in no apparent distress. Patient has all personal belongings. Patient IV access removed and ID bands kept for her and her baby. Discharge teaching reiterated with patient for her and her baby with opportunity for questions provided. Patient has received and understands all discharge instruction and scripts for her and her baby. Baby's security tag removed. Baby discharged in no apparent distress. Baby has a  follow up appointment with Dr Pily Hayes on 2017 at 1100. Patient escorted off of unit by patient transportation and family via wheelchair with baby in car seat.

## 2017-12-12 NOTE — H&P
History & Physical    Name: Ok Bloch MRN: [id number]  SSN: [id number]    YOB: 1990  Age: 33 y.o. Sex: female        Subjective:     Estimated Date of Delivery: 12/15/17  OB History      Para Term  AB Living    2 2 1   1    SAB TAB Ectopic Molar Multiple Live Births        0 1            MsBurak Darby is admitted with pregnancy at 39w2d for active labor. Prenatal course was normal. Please see prenatal records for details. Allergies   Allergen Reactions    Ibuprofen Swelling       Objective:     Vitals:  Vitals:    12/10/17 2000 12/10/17 2130 17 0935 17   BP: 130/67 119/77 120/56 122/65   Pulse: 92 71 79 77   Resp:  16 16 16   Temp:  98.6 °F (37 °C) 98.3 °F (36.8 °C) 98 °F (36.7 °C)   SpO2:  99% 99% 98%   Weight:       Height:            Physical Exam:  Patient without distress. Heart: Regular rate and rhythm, S1S2 present or without murmur or extra heart sounds  Lung: clear to auscultation throughout lung fields, no wheezes, no rales, no rhonchi and normal respiratory effort  Back: costovertebral angle tenderness absent  Abdomen: soft, nontender  Fundus: soft and non tender  Perineum: blood absent, amniotic fluid present  Cervical Exam: 5 cm dilated    80% effaced    -2 station    Membranes:  Spontaneous Rupture of Membranes; Amniotic Fluid: clear fluid  Fetal Heart Rate & Contraction pattern: Reactive    Prenatal Labs:   Lab Results   Component Value Date/Time    Rubella, External immune 2017    GrBStrep, External neg 2017    HBsAg, External neg 2017    HIV, External NR 2017    RPR, External NR 2017    Gonorrhea, External neg 2017    Chlamydia, External neg 2017         Assessment/Plan:     Plan: Admit for active labor, Reassuring fetal status, Labor  Progressing normally  Continue expectant management, Continue plan for vaginal delivery.  Epidural pain management per anesthesia at patient request.  Group B Strep was negative.     Signed By:  Babar Pan CNM     December 12, 2017

## 2017-12-12 NOTE — PROGRESS NOTES
Bedside shift change report given to MOOSE Sinclair RN (oncoming nurse) by Keisha Taveras RN (offgoing nurse). Report included the following information SBAR, Procedure Summary, Intake/Output, MAR and Recent Results. Discharge education completed. Pt verbalized understanding of diet, activity, personal care, s/s of infection, and importance of f/u care. Pt denied any additional questions.

## 2017-12-14 ENCOUNTER — APPOINTMENT (OUTPATIENT)
Dept: ULTRASOUND IMAGING | Age: 27
DRG: 776 | End: 2017-12-14
Attending: PHYSICIAN ASSISTANT
Payer: COMMERCIAL

## 2017-12-14 ENCOUNTER — HOSPITAL ENCOUNTER (INPATIENT)
Age: 27
LOS: 1 days | Discharge: HOME | DRG: 776 | End: 2017-12-15
Attending: EMERGENCY MEDICINE | Admitting: OBSTETRICS & GYNECOLOGY
Payer: COMMERCIAL

## 2017-12-14 DIAGNOSIS — R31.9 URINARY TRACT INFECTION WITH HEMATURIA, SITE UNSPECIFIED: ICD-10-CM

## 2017-12-14 DIAGNOSIS — N39.0 URINARY TRACT INFECTION WITH HEMATURIA, SITE UNSPECIFIED: ICD-10-CM

## 2017-12-14 DIAGNOSIS — N71.9 ENDOMETRITIS: Primary | ICD-10-CM

## 2017-12-14 LAB
ABO + RH BLD: NORMAL
ALBUMIN SERPL-MCNC: 2.6 G/DL (ref 3.4–5)
ALBUMIN/GLOB SERPL: 0.6 {RATIO} (ref 0.8–1.7)
ALP SERPL-CCNC: 159 U/L (ref 45–117)
ALT SERPL-CCNC: 41 U/L (ref 13–56)
ANION GAP SERPL CALC-SCNC: 10 MMOL/L (ref 3–18)
APPEARANCE UR: CLEAR
AST SERPL-CCNC: 42 U/L (ref 15–37)
BACTERIA URNS QL MICRO: ABNORMAL /HPF
BASOPHILS # BLD: 0 K/UL (ref 0–0.06)
BASOPHILS NFR BLD: 0 % (ref 0–2)
BILIRUB SERPL-MCNC: 0.3 MG/DL (ref 0.2–1)
BILIRUB UR QL: NEGATIVE
BLOOD GROUP ANTIBODIES SERPL: NORMAL
BUN SERPL-MCNC: 11 MG/DL (ref 7–18)
BUN/CREAT SERPL: 18 (ref 12–20)
CALCIUM SERPL-MCNC: 9 MG/DL (ref 8.5–10.1)
CHLORIDE SERPL-SCNC: 107 MMOL/L (ref 100–108)
CO2 SERPL-SCNC: 25 MMOL/L (ref 21–32)
COLOR UR: YELLOW
CREAT SERPL-MCNC: 0.61 MG/DL (ref 0.6–1.3)
DIFFERENTIAL METHOD BLD: ABNORMAL
EOSINOPHIL # BLD: 0.2 K/UL (ref 0–0.4)
EOSINOPHIL NFR BLD: 2 % (ref 0–5)
EPITH CASTS URNS QL MICRO: ABNORMAL /LPF (ref 0–5)
ERYTHROCYTE [DISTWIDTH] IN BLOOD BY AUTOMATED COUNT: 14.8 % (ref 11.6–14.5)
GLOBULIN SER CALC-MCNC: 4.5 G/DL (ref 2–4)
GLUCOSE BLD STRIP.AUTO-MCNC: 110 MG/DL (ref 70–110)
GLUCOSE BLD STRIP.AUTO-MCNC: 58 MG/DL (ref 70–110)
GLUCOSE SERPL-MCNC: 70 MG/DL (ref 74–99)
GLUCOSE UR STRIP.AUTO-MCNC: NEGATIVE MG/DL
HCT VFR BLD AUTO: 35.3 % (ref 35–45)
HGB BLD-MCNC: 11.4 G/DL (ref 12–16)
HGB UR QL STRIP: ABNORMAL
KETONES UR QL STRIP.AUTO: NEGATIVE MG/DL
LEUKOCYTE ESTERASE UR QL STRIP.AUTO: ABNORMAL
LYMPHOCYTES # BLD: 2.2 K/UL (ref 0.9–3.6)
LYMPHOCYTES NFR BLD: 18 % (ref 21–52)
MCH RBC QN AUTO: 25.8 PG (ref 24–34)
MCHC RBC AUTO-ENTMCNC: 32.3 G/DL (ref 31–37)
MCV RBC AUTO: 79.9 FL (ref 74–97)
MONOCYTES # BLD: 0.6 K/UL (ref 0.05–1.2)
MONOCYTES NFR BLD: 5 % (ref 3–10)
NEUTS SEG # BLD: 9.2 K/UL (ref 1.8–8)
NEUTS SEG NFR BLD: 75 % (ref 40–73)
NITRITE UR QL STRIP.AUTO: NEGATIVE
PH UR STRIP: 7.5 [PH] (ref 5–8)
PLATELET # BLD AUTO: 320 K/UL (ref 135–420)
PMV BLD AUTO: 8.5 FL (ref 9.2–11.8)
POTASSIUM SERPL-SCNC: 3.6 MMOL/L (ref 3.5–5.5)
PROT SERPL-MCNC: 7.1 G/DL (ref 6.4–8.2)
PROT UR STRIP-MCNC: NEGATIVE MG/DL
RBC # BLD AUTO: 4.42 M/UL (ref 4.2–5.3)
RBC #/AREA URNS HPF: ABNORMAL /HPF (ref 0–5)
SODIUM SERPL-SCNC: 142 MMOL/L (ref 136–145)
SP GR UR REFRACTOMETRY: 1.01 (ref 1–1.03)
SPECIMEN EXP DATE BLD: NORMAL
URATE SERPL-MCNC: 4.8 MG/DL (ref 2.6–7.2)
UROBILINOGEN UR QL STRIP.AUTO: 0.2 EU/DL (ref 0.2–1)
WBC # BLD AUTO: 12.2 K/UL (ref 4.6–13.2)
WBC URNS QL MICRO: ABNORMAL /HPF (ref 0–5)

## 2017-12-14 PROCEDURE — 96374 THER/PROPH/DIAG INJ IV PUSH: CPT

## 2017-12-14 PROCEDURE — 84550 ASSAY OF BLOOD/URIC ACID: CPT | Performed by: PHYSICIAN ASSISTANT

## 2017-12-14 PROCEDURE — 85025 COMPLETE CBC W/AUTO DIFF WBC: CPT | Performed by: PHYSICIAN ASSISTANT

## 2017-12-14 PROCEDURE — 99285 EMERGENCY DEPT VISIT HI MDM: CPT

## 2017-12-14 PROCEDURE — 96361 HYDRATE IV INFUSION ADD-ON: CPT

## 2017-12-14 PROCEDURE — 96375 TX/PRO/DX INJ NEW DRUG ADDON: CPT

## 2017-12-14 PROCEDURE — 74011250636 HC RX REV CODE- 250/636: Performed by: PHYSICIAN ASSISTANT

## 2017-12-14 PROCEDURE — 86900 BLOOD TYPING SEROLOGIC ABO: CPT | Performed by: PHYSICIAN ASSISTANT

## 2017-12-14 PROCEDURE — 96376 TX/PRO/DX INJ SAME DRUG ADON: CPT

## 2017-12-14 PROCEDURE — 81001 URINALYSIS AUTO W/SCOPE: CPT | Performed by: PHYSICIAN ASSISTANT

## 2017-12-14 PROCEDURE — 65270000029 HC RM PRIVATE

## 2017-12-14 PROCEDURE — 82962 GLUCOSE BLOOD TEST: CPT

## 2017-12-14 PROCEDURE — 74011000250 HC RX REV CODE- 250: Performed by: PHYSICIAN ASSISTANT

## 2017-12-14 PROCEDURE — 74011250637 HC RX REV CODE- 250/637: Performed by: PHYSICIAN ASSISTANT

## 2017-12-14 PROCEDURE — 80053 COMPREHEN METABOLIC PANEL: CPT | Performed by: PHYSICIAN ASSISTANT

## 2017-12-14 PROCEDURE — 87086 URINE CULTURE/COLONY COUNT: CPT | Performed by: PHYSICIAN ASSISTANT

## 2017-12-14 PROCEDURE — 74011000258 HC RX REV CODE- 258: Performed by: PHYSICIAN ASSISTANT

## 2017-12-14 PROCEDURE — 74011250637 HC RX REV CODE- 250/637: Performed by: ADVANCED PRACTICE MIDWIFE

## 2017-12-14 PROCEDURE — 76856 US EXAM PELVIC COMPLETE: CPT

## 2017-12-14 RX ORDER — ACETAMINOPHEN 500 MG
1000 TABLET ORAL
Status: COMPLETED | OUTPATIENT
Start: 2017-12-14 | End: 2017-12-14

## 2017-12-14 RX ORDER — DEXTROSE 50 % IN WATER (D50W) INTRAVENOUS SYRINGE
25
Status: COMPLETED | OUTPATIENT
Start: 2017-12-14 | End: 2017-12-14

## 2017-12-14 RX ORDER — CLINDAMYCIN PHOSPHATE 900 MG/50ML
900 INJECTION, SOLUTION INTRAVENOUS
Status: COMPLETED | OUTPATIENT
Start: 2017-12-14 | End: 2017-12-14

## 2017-12-14 RX ORDER — ONDANSETRON 2 MG/ML
4 INJECTION INTRAMUSCULAR; INTRAVENOUS
Status: COMPLETED | OUTPATIENT
Start: 2017-12-14 | End: 2017-12-14

## 2017-12-14 RX ORDER — ZOLPIDEM TARTRATE 5 MG/1
5 TABLET ORAL
Status: DISCONTINUED | OUTPATIENT
Start: 2017-12-14 | End: 2017-12-15 | Stop reason: HOSPADM

## 2017-12-14 RX ORDER — CEFTRIAXONE 1 G/1
1 INJECTION, POWDER, FOR SOLUTION INTRAMUSCULAR; INTRAVENOUS
Status: DISCONTINUED | OUTPATIENT
Start: 2017-12-14 | End: 2017-12-14 | Stop reason: RX

## 2017-12-14 RX ORDER — OXYCODONE AND ACETAMINOPHEN 5; 325 MG/1; MG/1
2 TABLET ORAL
Status: DISCONTINUED | OUTPATIENT
Start: 2017-12-14 | End: 2017-12-15 | Stop reason: HOSPADM

## 2017-12-14 RX ORDER — MORPHINE SULFATE 4 MG/ML
4 INJECTION INTRAVENOUS
Status: COMPLETED | OUTPATIENT
Start: 2017-12-14 | End: 2017-12-14

## 2017-12-14 RX ORDER — ONDANSETRON 2 MG/ML
4 INJECTION INTRAMUSCULAR; INTRAVENOUS
Status: DISCONTINUED | OUTPATIENT
Start: 2017-12-14 | End: 2017-12-15 | Stop reason: HOSPADM

## 2017-12-14 RX ORDER — ACETAMINOPHEN 325 MG/1
650 TABLET ORAL
Status: DISCONTINUED | OUTPATIENT
Start: 2017-12-14 | End: 2017-12-15 | Stop reason: HOSPADM

## 2017-12-14 RX ADMIN — SODIUM CHLORIDE 1000 ML: 900 INJECTION, SOLUTION INTRAVENOUS at 11:35

## 2017-12-14 RX ADMIN — SODIUM CHLORIDE 1.5 G: 900 INJECTION, SOLUTION INTRAVENOUS at 12:48

## 2017-12-14 RX ADMIN — OXYCODONE HYDROCHLORIDE AND ACETAMINOPHEN 2 TABLET: 5; 325 TABLET ORAL at 20:06

## 2017-12-14 RX ADMIN — OXYCODONE HYDROCHLORIDE AND ACETAMINOPHEN 2 TABLET: 5; 325 TABLET ORAL at 14:45

## 2017-12-14 RX ADMIN — CEFTRIAXONE 1 G: 1 INJECTION, POWDER, FOR SOLUTION INTRAMUSCULAR; INTRAVENOUS at 10:18

## 2017-12-14 RX ADMIN — MORPHINE SULFATE 4 MG: 4 INJECTION INTRAVENOUS at 10:55

## 2017-12-14 RX ADMIN — ONDANSETRON 4 MG: 2 INJECTION INTRAMUSCULAR; INTRAVENOUS at 08:16

## 2017-12-14 RX ADMIN — ACETAMINOPHEN 1000 MG: 500 TABLET ORAL at 12:51

## 2017-12-14 RX ADMIN — MORPHINE SULFATE 4 MG: 4 INJECTION INTRAVENOUS at 08:17

## 2017-12-14 RX ADMIN — GENTAMICIN SULFATE 325 MG: 40 INJECTION, SOLUTION INTRAMUSCULAR; INTRAVENOUS at 14:46

## 2017-12-14 RX ADMIN — DEXTROSE MONOHYDRATE 25 G: 25 INJECTION, SOLUTION INTRAVENOUS at 11:36

## 2017-12-14 RX ADMIN — SODIUM CHLORIDE 1.5 G: 900 INJECTION, SOLUTION INTRAVENOUS at 23:57

## 2017-12-14 RX ADMIN — SODIUM CHLORIDE 1000 ML: 900 INJECTION, SOLUTION INTRAVENOUS at 08:15

## 2017-12-14 RX ADMIN — SODIUM CHLORIDE 1.5 G: 900 INJECTION, SOLUTION INTRAVENOUS at 18:06

## 2017-12-14 RX ADMIN — CLINDAMYCIN PHOSPHATE 900 MG: 900 INJECTION, SOLUTION INTRAVENOUS at 13:32

## 2017-12-14 NOTE — IP AVS SNAPSHOT
[address] [place] [id number] 
[phone] Patient: Sahra Conte MRN: [id number] :1990 My Medications TAKE these medications as instructed Instructions Each Dose to Equal  
 Morning Noon Evening Bedtime  
 amoxicillin-clavulanate 875-125 mg per tablet Commonly known as:  AUGMENTIN Your last dose was: Your next dose is: Take 1 Tab by mouth two (2) times a day. Indications: endometritis 1 Tab  
    
   
   
   
  
 oxyCODONE-acetaminophen 5-325 mg per tablet Commonly known as:  PERCOCET Your last dose was: Your next dose is: Take 1 Tab by mouth every six (6) hours as needed. Max Daily Amount: 4 Tabs. Indications: Pain 1 Tab PNV with Ca No.65-Iron Poly-FA 60 mg iron-1 mg Cap Your last dose was: Your next dose is: Take 1 Tab by mouth daily. Indications: PREGNANCY  
 1 Tab Where to Get Your Medications Information on where to get these meds will be given to you by the nurse or doctor. ! Ask your nurse or doctor about these medications  
  amoxicillin-clavulanate 875-125 mg per tablet

## 2017-12-14 NOTE — H&P
Obstetrics History and Physical    Name: Nena Weber MRN: [id number] SSN: [id number]    YOB: 1990  Age: 33 y.o. Sex: female       Subjective:      Chief complaint:  Postpartum abdominal pain and fever    Moses Su is a 33 y.o.  female with a history of recent spontaneous vaginal delivery on 12/10 present to the ER with complaints of abdominal pain radiating towards her back bilaterally and fever of 102 at home. Shivering. Flank pain. Ultrasound findings include reviewed w/ ER PA.     OB History      Para Term  AB Living    2 2 1   1    SAB TAB Ectopic Molar Multiple Live Births        0 1        Past Medical History:   Diagnosis Date    Infertility, female     Polycystic disease, ovaries 2007     Past Surgical History:   Procedure Laterality Date    HX OTHER SURGICAL  2006    cyst removal     Social History     Occupational History    Not on file. Social History Main Topics    Smoking status: Never Smoker    Smokeless tobacco: Never Used    Alcohol use No    Drug use: No    Sexual activity: Yes     Partners: Male     Birth control/ protection: None     Family History   Problem Relation Age of Onset    Hypertension Maternal Grandmother         Allergies   Allergen Reactions    Ibuprofen Swelling     Prior to Admission medications    Medication Sig Start Date End Date Taking? Authorizing Provider   oxyCODONE-acetaminophen (PERCOCET) 5-325 mg per tablet Take 1 Tab by mouth every six (6) hours as needed. Max Daily Amount: 4 Tabs. Indications: Pain 17   Annmarie Pacheco CNM   PNV with Ca No.65-Iron Poly-FA 60 mg iron-1 mg cap Take 1 Tab by mouth daily. Indications: PREGNANCY    Historical Provider        Review of Systems  A comprehensive review of systems was negative except for that written in the History of Present Illness.     Objective:     Vitals:    17 1130 17 1250 17 1307 17 1308   BP: (!) 145/92 (!) 146/99  142/74   Pulse: 95 (!) 107  (!) 102   Resp: 18 18  18   Temp:  (!) 101.2 °F (38.4 °C) (!) 102.1 °F (38.9 °C) 100.3 °F (37.9 °C)   SpO2: 100% 100%  100%   Weight: 87.1 kg (192 lb)      Height:           Physical Exam:  Patient with distress. Heart: Regular rate and rhythm or S1S2 present  Lung: clear to auscultation throughout lung fields, no wheezes, no rales, no rhonchi and normal respiratory effort  Back: costovertebral angle tenderness present  Abdomen: soft, normal bowel sounds, fundal tenderness  Pelvic: already performed in ER, unremarkable  Extr: no DCT/edema  Assessment/Plan:     Active Problems:    Endometritis (2017)       24y/o PPD#4 s/p  w/ abdominal pain and fever.  - Admit and treat for presumed pp endometritis w/ cultures pending. Cont Gent/Clinda/Amp until afebrile x 24-48hrs. - Elevated BP's: obtain PIH labs and 24h ur collection. No h/o BP issues. - Urinary symptoms: UA/UCx obtained. B/L flank pain and fever.         Signed By:  Rosana Gonzalez MD     2017

## 2017-12-14 NOTE — IP AVS SNAPSHOT
[address] [address] 
[phone] Patient: Terrence Watt MRN: [id number] :1990 About your hospitalization You were admitted on:  2017 You last received care in the:  [address] [place] [address] You were discharged on:  December 15, 2017 Why you were hospitalized Your primary diagnosis was:  Not on File Your diagnoses also included:  Endometritis Things You Need To Do (next 8 weeks) Follow up with Derrick Thakur MD  
  
Phone:  [phone] Where:  [facility], [address] [place] [address], [address] [place] [address] Follow up with Candace Whitman MD  
  
Phone:  [phone] [facility]  [facility], [address] Follow up with Derrick Thakur MD in 1 week(s) Phone:  [phone] Where:  [facility], [address] [place] [address], [address] [place] [address] Discharge Orders None A check debra indicates which time of day the medication should be taken. My Medications TAKE these medications as instructed Instructions Each Dose to Equal  
 Morning Noon Evening Bedtime  
 amoxicillin-clavulanate 875-125 mg per tablet Commonly known as:  AUGMENTIN Your last dose was: Your next dose is: Take 1 Tab by mouth two (2) times a day. Indications: endometritis 1 Tab  
    
   
   
   
  
 oxyCODONE-acetaminophen 5-325 mg per tablet Commonly known as:  PERCOCET Your last dose was: Your next dose is: Take 1 Tab by mouth every six (6) hours as needed. Max Daily Amount: 4 Tabs. Indications: Pain 1 Tab PNV with Ca No.65-Iron Poly-FA 60 mg iron-1 mg Cap Your last dose was: Your next dose is: Take 1 Tab by mouth daily. Indications: PREGNANCY  
 1 Tab Where to Get Your Medications Information on where to get these meds will be given to you by the nurse or doctor. ! Ask your nurse or doctor about these medications  
  amoxicillin-clavulanate 875-125 mg per tablet Discharge Instructions Postpartum Endometritis: Care Instructions Your Care Instructions Postpartum endometritis is an infection of the lining of the uterus after you give birth. It is treated with antibiotics. It is very important to treat this problem. If you don't, you can get a more serious infection. It could cause problems, such as scars on the pelvic organs. Or it could prevent you from having more children. Follow-up care is a key part of your treatment and safety. Be sure to make and go to all appointments, and call your doctor if you are having problems. It's also a good idea to know your test results and keep a list of the medicines you take. How can you care for yourself at home? · Take your antibiotics as directed. Do not stop taking them just because you feel better. You need to take the full course of antibiotics. · Rest until you feel better. · Take an over-the-counter pain medicine, such as acetaminophen (Tylenol) or ibuprofen (Advil, Motrin). Read and follow all instructions on the label. · Do not take two or more pain medicines at the same time unless the doctor told you to. Many pain medicines have acetaminophen, which is Tylenol. Too much acetaminophen (Tylenol) can be harmful. · If you have belly pain, use a hot water bottle. Or you can use a heating pad set on low. Put a thin cloth between the heating pad and your skin. · Do not have sex or use tampons until your doctor says it's safe. Use pads instead of tampons. When should you call for help? Call 911 anytime you think you may need emergency care. For example, call if: 
· You passed out (lost consciousness). Call your doctor now or seek immediate medical care if: 
· You have severe vaginal bleeding. · You are dizzy or lightheaded, or you feel like you may faint. · You have a fever. · You have new or worse pain in your belly or pelvis. Watch closely for changes in your health, and be sure to contact your doctor if: 
· Your vaginal bleeding seems to be getting heavier. · You have new or worse vaginal discharge. · You feel sad, anxious, or hopeless for more than a few days. · You do not get better as expected. Where can you learn more? Go to http://sharri-fredo.info/. Enter Y782 in the search box to learn more about \"Postpartum Endometritis: Care Instructions. \" Current as of: March 16, 2017 Content Version: 11.4 © 4659-9245 [employer]. Care instructions adapted under license by [employer] (which disclaims liability or warranty for this information). If you have questions about a medical condition or this instruction, always ask your healthcare professional. [address] any warranty or liability for your use of this information. Introducing [facility]! Navin Fox introduces [employer] patient portal. Now you can access parts of your medical record, email your doctor's office, and request medication refills online. 1. In your internet browser, go to [email or web address] 2. Click on the First Time User? Click Here link in the Sign In box. You will see the New Member Sign Up page. 3. Enter your [employer] Access Code exactly as it appears below. You will not need to use this code after youve completed the sign-up process. If you do not sign up before the expiration date, you must request a new code. · [employer] Access Code: [email or web address] Expires: 3/6/2018  1:27 PM 
 
4. Enter the last four digits of your Social Security Number (xxxx) and Date of Birth (mm/dd/yyyy) as indicated and click Submit. You will be taken to the next sign-up page. 5. Create a [employer]ish ID. This will be your [employer] login ID and cannot be changed, so think of one that is secure and easy to remember. 6. Create a [employer] password. You can change your password at any time. 7. Enter your Password Reset Question and Answer. This can be used at a later time if you forget your password. 8. Enter your e-mail address. You will receive e-mail notification when new information is available in [address] 9. Click Sign Up. You can now view and download portions of your medical record. 10. Click the Download Summary menu link to download a portable copy of your medical information. If you have questions, please visit the Frequently Asked Questions section of the [employer] website. Remember, [employer] is NOT to be used for urgent needs. For medical emergencies, dial 911. Now available from your iPhone and Android! Unresulted Labs-Please follow up with your PCP about these lab tests Order Current Status CULTURE, URINE Preliminary result Providers Seen During Your Hospitalization Provider Specialty Primary office phone Kevin Viera MD Emergency Medicine [phone] Sindhu Reese MD Obstetrics & Gynecology [phone] Your Primary Care Physician (PCP) Primary Care Physician Office Phone Office Fax Joel Gonsalves [phone] You are allergic to the following Allergen Reactions Ibuprofen Swelling Recent Documentation Height Weight BMI OB Status Smoking Status 1.575 m 85 kg 34.28 kg/m2 Recent pregnancy Never Smoker Emergency Contacts Name Discharge Info Relation Home Work Mobile Maya Juarez N/A  AT THIS TIME [6] Parent [1] [phone] Rashawn Bradshaw N/A  AT THIS TIME [6] Spouse [3]   [phone] Patient Belongings The following personal items are in your possession at time of discharge: Dental Appliances: None  Visual Aid: None      Home Medications: None   Jewelry: Necklace  Clothing: At bedside    Other Valuables: At bedside Please provide this summary of care documentation to your next provider. Signatures-by signing, you are acknowledging that this After Visit Summary has been reviewed with you and you have received a copy. Patient Signature:  ____________________________________________________________ Date:  ____________________________________________________________  
  
[facility] Provider Signature:  ____________________________________________________________ Date:  ____________________________________________________________

## 2017-12-14 NOTE — ED PROVIDER NOTES
EMERGENCY DEPARTMENT HISTORY AND PHYSICAL EXAM    Date: 12/14/2017  Patient Name: Rolan Howell    History of Presenting Illness     Chief Complaint   Patient presents with    Post-Partum Complications         History Provided By: Patient    Chief Complaint: Abdominal pain  Duration: 2 Days  Timing:  Acute  Location: Suprapubic region  Quality: contraction like  Severity: Severe  Modifying Factors: No alleviation with Tylenol or Tylenol. Associated Symptoms: fever (max 102 F), vaginal bleeding (small clots), and severe back pain    Additional History (Context):   7:42 AM  Rolan Howell is a 33 y.o. female who is 4 days post-partum with PMHx of poly cystic disease who presents to the emergency department C/O severe suprapubic abdominal pain similar to contractions radiating to bilateral flanks onset 2 days ago. Associated sxs include fever (max 102 F), vaginal bleeding (small clots), and severe back pain. Pt is 4 days post-partum; uncomplicated vaginal delivery. Pt is not breastfeeding. Pt was rx'ed Percocet but states she is not taking it due to having children at home and it makes her sleepy. No alleviation with Tylenol and Advil. Pt denies contacting her OB (Dr. Uriel Chou). Last BM was 5.5 hours ago. Pt denies tobacco/EtOH/illicit drug use, urinary frequency, and any other sxs or complaints.      PCP: Kath Kuo MD    Current Facility-Administered Medications   Medication Dose Route Frequency Provider Last Rate Last Dose    sodium chloride 0.9 % bolus infusion 1,000 mL  1,000 mL IntraVENous ONCE NUBIA Rasmussen 1,000 mL/hr at 12/14/17 1135 1,000 mL at 12/14/17 1135    clindamycin (CLEOCIN) 900mg NS 50mL IVPB (premix)  900 mg IntraVENous NOW Ramin Martinez Alaeladioma        gentamicin (GARAMYCIN) 250.4 mg in 0.9% sodium chloride 100 mL IVPB  5 mg/kg (Ideal) IntraVENous Q24H Ramin Martinez Alaeladioma        ampicillin-sulbactam 1.5 g in 0.9% sodium chloride (MBP/ADV) 50 mL ADV  1.5 g IntraVENous Q6H Carlos Hair, [address]         Current Outpatient Prescriptions   Medication Sig Dispense Refill    oxyCODONE-acetaminophen (PERCOCET) 5-325 mg per tablet Take 1 Tab by mouth every six (6) hours as needed. Max Daily Amount: 4 Tabs. Indications: Pain 20 Tab 0    PNV with Ca No.65-Iron Poly-FA 60 mg iron-1 mg cap Take 1 Tab by mouth daily. Indications: PREGNANCY         Past History     Past Medical History:  Past Medical History:   Diagnosis Date    Infertility, female     Polycystic disease, ovaries 2007       Past Surgical History:  Past Surgical History:   Procedure Laterality Date    HX OTHER SURGICAL  2006    cyst removal       Family History:  Family History   Problem Relation Age of Onset    Hypertension Maternal Grandmother        Social History:  Social History   Substance Use Topics    Smoking status: Never Smoker    Smokeless tobacco: Never Used    Alcohol use No       Allergies: Allergies   Allergen Reactions    Ibuprofen Swelling         Review of Systems   Review of Systems   Constitutional: Positive for fever. Gastrointestinal: Positive for abdominal pain (suprapubic). Genitourinary: Positive for flank pain (bilaterally) and vaginal bleeding. Negative for frequency. Musculoskeletal: Positive for back pain. All other systems reviewed and are negative. Physical Exam     Vitals:    12/14/17 0854 12/14/17 1000 12/14/17 1055 12/14/17 1130   BP: 133/78 138/78 155/88 (!) 145/92   Pulse: 61 78 98 95   Resp: 18 18 20 18   Temp:   99.8 °F (37.7 °C)    SpO2: 100% 100% 100% 100%   Weight:    87.1 kg (192 lb)   Height:         Physical Exam   Constitutional: She is oriented to person, place, and time. She appears well-developed and well-nourished. Mild pain distress   HENT:   Head: Normocephalic and atraumatic. Eyes: EOM are normal. Pupils are equal, round, and reactive to light. Neck: Neck supple. No tracheal deviation present.    Cardiovascular: Normal rate, regular rhythm and normal heart sounds. Pulmonary/Chest: Effort normal and breath sounds normal. No respiratory distress. She has no wheezes. She has no rales. Abdominal: Soft. Bowel sounds are normal. She exhibits no distension and no mass. There is tenderness. There is no rebound and no guarding. + TTP over the lower abdomen, no rebound or guarding. Negative CVAT. Lymphadenopathy:     She has no cervical adenopathy. Neurological: She is alert and oriented to person, place, and time. No cranial nerve deficit. Skin: Skin is warm and dry. No rash noted. She is not diaphoretic. No erythema. No pallor. Psychiatric: She has a normal mood and affect. Her behavior is normal.   Nursing note and vitals reviewed. Diagnostic Study Results     Labs -     Recent Results (from the past 12 hour(s))   CBC WITH AUTOMATED DIFF    Collection Time: 12/14/17  7:53 AM   Result Value Ref Range    WBC 12.2 4.6 - 13.2 K/uL    RBC 4.42 4.20 - 5.30 M/uL    HGB 11.4 (L) 12.0 - 16.0 g/dL    HCT 35.3 35.0 - 45.0 %    MCV 79.9 74.0 - 97.0 FL    MCH 25.8 24.0 - 34.0 PG    MCHC 32.3 31.0 - 37.0 g/dL    RDW 14.8 (H) 11.6 - 14.5 %    PLATELET 710 873 - 632 K/uL    MPV 8.5 (L) 9.2 - 11.8 FL    NEUTROPHILS 75 (H) 40 - 73 %    LYMPHOCYTES 18 (L) 21 - 52 %    MONOCYTES 5 3 - 10 %    EOSINOPHILS 2 0 - 5 %    BASOPHILS 0 0 - 2 %    ABS. NEUTROPHILS 9.2 (H) 1.8 - 8.0 K/UL    ABS. LYMPHOCYTES 2.2 0.9 - 3.6 K/UL    ABS. MONOCYTES 0.6 0.05 - 1.2 K/UL    ABS. EOSINOPHILS 0.2 0.0 - 0.4 K/UL    ABS.  BASOPHILS 0.0 0.0 - 0.06 K/UL    DF AUTOMATED     METABOLIC PANEL, COMPREHENSIVE    Collection Time: 12/14/17  7:53 AM   Result Value Ref Range    Sodium 142 136 - 145 mmol/L    Potassium 3.6 3.5 - 5.5 mmol/L    Chloride 107 100 - 108 mmol/L    CO2 25 21 - 32 mmol/L    Anion gap 10 3.0 - 18 mmol/L    Glucose 70 (L) 74 - 99 mg/dL    BUN 11 7.0 - 18 MG/DL    Creatinine 0.61 0.6 - 1.3 MG/DL    BUN/Creatinine ratio 18 12 - 20      GFR est AA >60 >60 ml/min/1.73m2    GFR est non-AA >60 >60 ml/min/1.73m2    Calcium 9.0 8.5 - 10.1 MG/DL    Bilirubin, total 0.3 0.2 - 1.0 MG/DL    ALT (SGPT) 41 13 - 56 U/L    AST (SGOT) 42 (H) 15 - 37 U/L    Alk. phosphatase 159 (H) 45 - 117 U/L    Protein, total 7.1 6.4 - 8.2 g/dL    Albumin 2.6 (L) 3.4 - 5.0 g/dL    Globulin 4.5 (H) 2.0 - 4.0 g/dL    A-G Ratio 0.6 (L) 0.8 - 1.7     TYPE & SCREEN    Collection Time: 12/14/17  7:53 AM   Result Value Ref Range    Crossmatch Expiration 12/17/2017     ABO/Rh(D) O POSITIVE     Antibody screen NEG    URINALYSIS W/ RFLX MICROSCOPIC    Collection Time: 12/14/17  7:53 AM   Result Value Ref Range    Color YELLOW      Appearance CLEAR      Specific gravity 1.013 1.005 - 1.030      pH (UA) 7.5 5.0 - 8.0      Protein NEGATIVE  NEG mg/dL    Glucose NEGATIVE  NEG mg/dL    Ketone NEGATIVE  NEG mg/dL    Bilirubin NEGATIVE  NEG      Blood LARGE (A) NEG      Urobilinogen 0.2 0.2 - 1.0 EU/dL    Nitrites NEGATIVE  NEG      Leukocyte Esterase LARGE (A) NEG     URINE MICROSCOPIC ONLY    Collection Time: 12/14/17  7:53 AM   Result Value Ref Range    WBC 41 to 50 0 - 5 /hpf    RBC 51 to 60 0 - 5 /hpf    Epithelial cells 1+ 0 - 5 /lpf    Bacteria 1+ (A) NEG /hpf   GLUCOSE, POC    Collection Time: 12/14/17 11:20 AM   Result Value Ref Range    Glucose (POC) 58 (L) 70 - 110 mg/dL   GLUCOSE, POC    Collection Time: 12/14/17 11:49 AM   Result Value Ref Range    Glucose (POC) 110 70 - 110 mg/dL       Radiologic Studies -   US PELV NON OBS   Final Result   IMPRESSION:     1. Mildly enlarged uterus, with a somewhat thickened and heterogeneous  appearance to the endometrium, not unexpected given recent postpartum state. Portions of the endometrium appear relatively hypervascular, which while  nonspecific could reflect underlying endometritis, given the symptoms of fever  and suprapubic pain. Correlation for uterine fundal tenderness recommended.     As read by the radiologist.     CT Results  (Last 48 hours) None        CXR Results  (Last 48 hours)    None          Medications given in the ED-  Medications   sodium chloride 0.9 % bolus infusion 1,000 mL (1,000 mL IntraVENous New Bag 12/14/17 1135)   clindamycin (CLEOCIN) 900mg NS 50mL IVPB (premix) (not administered)   gentamicin (GARAMYCIN) 250.4 mg in 0.9% sodium chloride 100 mL IVPB (not administered)   ampicillin-sulbactam 1.5 g in 0.9% sodium chloride (MBP/ADV) 50 mL ADV (not administered)   sodium chloride 0.9 % bolus infusion 1,000 mL (0 mL IntraVENous IV Completed 12/14/17 0915)   ondansetron (ZOFRAN) injection 4 mg (4 mg IntraVENous Given 12/14/17 0816)   morphine 4 mg (4 mg IntraVENous Given 12/14/17 0817)   cefTRIAXone (ROCEPHIN) 1 g in sterile water (preservative free) 10 mL IV syringe (1 g IntraVENous Given 12/14/17 1018)   morphine 4 mg (4 mg IntraVENous Given 12/14/17 1055)   dextrose (D50W) injection syrg 25 g (25 g IntraVENous Given 12/14/17 1136)         Medical Decision Making   I am the first provider for this patient. I reviewed the vital signs, available nursing notes, past medical history, past surgical history, family history and social history. Vital Signs-Reviewed the patient's vital signs. Pulse Oximetry Analysis - 100% on RA. Records Reviewed: Nursing Notes      Procedures:  Pelvic Exam  Date/Time: 12/14/2017 11:19 AM  Performed by: PA  Procedure duration:  5 minutes. Type of exam performed: bimanual.    External genitalia appearance: normal.    Vaginal exam:  bleeding. Bleeding: moderate  Bimanual exam:  uterine enlargement and uterine tenderness. Patient tolerance: Patient tolerated the procedure well with no immediate complications        ED Course:   7:42 AM Initial assessment performed. The patients presenting problems have been discussed, and they are in agreement with the care plan formulated and outlined with them. I have encouraged them to ask questions as they arise throughout their visit.     PROGRESS NOTE: 11:17 AM  Pt has been re-examined by Ledy Crespo PA-C. Performed bimanual exam on pt, who is tender to palpation on her uterus, which is concerning for endometritits. Pt is also now shivering. She is not febrile (99.8 F), however her b/p has increased to 178/100. Page is out to OB/GYN. Pt does not want any medication at this time. PROGRESS NOTE:   11:27 AM  Spoke briefly to Dr. Elvira Cheadle, pt's OB/GYN, asked me to call the on call physician for her group. CONSULT NOTE:   11:33 AM  Ledy Crespo PA-C spoke with Wendy Mcintyre MD   Specialty: OB/GYN  Discussed pt's hx, disposition, and available diagnostic and imaging results over the telephone. Reviewed care plans. Consulting physician states to admit the pt. Start her on Clindamycin, Gentamycin and Ampicillin. Wants a uric acid drawn and a 24 hour urine started on her. Diagnosis and Disposition     Critical Care Time: 0    Core Measures:  For Hospitalized Patients:    1. Hospitalization Decision Time:  The decision to hospitalize the patient was made by Wendy Mcintyre MD (ObGYN) at 11:33 AM on 12/14/2017.    2. Aspirin: Aspirin was not given because the patient did not present with a stroke at the time of their Emergency Department evaluation    11:33 AM  Patient is being admitted to the hospital by Wendy Mcintyre MD (ObGYN). The results of their tests and reasons for their admission have been discussed with them and/or available family. They convey agreement and understanding for the need to be admitted and for their admission diagnosis. CONDITIONS ON ADMISSION:  Sepsis is not present at the time of admission. Deep Vein Thrombosis is not present at the time of admission. Thrombosis is not present at the time of admission. Urinary Tract Infection is present at the time of admission. Pneumonia is not present at the time of admission. MRSA is not present at the time of admission. Wound infection is not present at the time of admission.  Pressure Ulcer is not present at the time of admission. CLINICAL IMPRESSION:    1. Endometritis    2. Urinary tract infection with hematuria, site unspecified        _______________________________    Attestations: This note is prepared by Jilda Gowers, acting as Scribe for [facility]. Magdalena Douglas PA-C:  The scribe's documentation has been prepared under my direction and personally reviewed by me in its entirety.   I confirm that the note above accurately reflects all work, treatment, procedures, and medical decision making performed by me.  _______________________________

## 2017-12-14 NOTE — ED NOTES
TRANSFER - OUT REPORT:    Bedside report given to Rusty Aiken RN on [place] Eduardo  being transferred to [facility] for routine progression of care       Report consisted of patients Situation, Background, Assessment and   Recommendations(SBAR). Functional and skin assessment reviewed. Information from the following report(s) SBAR, ED Summary, [facility] and Recent Results was reviewed with the receiving nurse. Lines:   Peripheral IV 12/14/17 Left Antecubital (Active)   Site Assessment Clean, dry, & intact 12/14/2017  1:22 PM   Phlebitis Assessment 0 12/14/2017  1:22 PM   Infiltration Assessment 0 12/14/2017  1:22 PM   Dressing Status Clean, dry, & intact 12/14/2017  1:22 PM   Dressing Type Transparent 12/14/2017  1:22 PM   Hub Color/Line Status Infusing 12/14/2017  1:22 PM   Alcohol Cap Used Yes 12/14/2017  1:22 PM        Opportunity for questions and clarification was provided.       Patient transported with:   Registered Nurse

## 2017-12-14 NOTE — ED NOTES
Pt medicated per verbal order of Ezequiel Dick, [address] for fever prior to IP transfer. IV fluids and abx continue to infuse. Pt calm and cooperative and in NAD at this time.

## 2017-12-14 NOTE — ED NOTES
Pt presents to ED post partum x 4 days from uncomplicated vaginal delivery. Pt reports saturating 1 pad q. 2 hrs and states she has lower abdominal pain (10/10) that radiates up her back and is sharp in nature. Pt denies urinary symptoms, N/V/D. Pt is not tachycardic and able to move with good extremity strength. Pt is calm and cooperative. Pt medicated per [facility] for pain. Father-in-law at bedside and will be pt's ride home. Pt currently in ultrasound.

## 2017-12-14 NOTE — ED TRIAGE NOTES
Sepsis Screening completed    (  )Patient meets SIRS criteria. (x)Patient does not meet SIRS criteria.       SIRS Criteria is achieved when two or more of the following are present   Temperature < 96.8°F (36°C) or > 100.9°F (38.3°C)   Heart Rate > 90 beats per minute   Respiratory Rate > 20 breaths per minute   WBC count > 12,000 or <4,000 or > 10% bands

## 2017-12-14 NOTE — ED TRIAGE NOTES
Reports that she gave birth on Sunday; uncomplicated vaginal delivery. Since then she reports having significant ABD pain that radiates to the Bilateral Flank. Also complaining of mild fever and small amount of vaginal bleeding.  Has not callled her OB MD.

## 2017-12-14 NOTE — PROGRESS NOTES
Pharmacy Dosing Services: Gentamicin    Consult for Vancomycin Dosing by Pharmacy by [address] [place] [address] provided for this 33y.o. year old female , for indication of OB/GYN/Bloodstream Infection. Day of Therapy 01    Ht Readings from Last 1 Encounters:   12/14/17 157.5 cm (62\")        Wt Readings from Last 1 Encounters:   12/14/17 87.1 kg (192 lb)        Previous Regimen NA   Last Level NA   Other Current Antibiotics Clindamycin 900 mg x1, Unasyn 1.5 gm IV q6   Significant Cultures Pending   Serum Creatinine Lab Results   Component Value Date/Time    Creatinine 0.61 12/14/2017 07:53 AM      Creatinine Clearance Estimated Creatinine Clearance: 143.2 mL/min (based on Cr of 0.61). BUN Lab Results   Component Value Date/Time    BUN 11 12/14/2017 07:53 AM      WBC Lab Results   Component Value Date/Time    WBC 12.2 12/14/2017 07:53 AM      H/H Lab Results   Component Value Date/Time    HGB 11.4 12/14/2017 07:53 AM      Platelets Lab Results   Component Value Date/Time    PLATELET 172 11/14/4313 07:53 AM      Temp 99.8 °F (37.7 °C)     TBW: 74% > IBW (50 kg), therefore ABW of 65 kg will be utilized. Pulse dosing Aminoglycoside 5 mg/kg = 325 mg dose. Gentamicin Random ordered for midnight [id number] (10-hr post-dose completion)    -Pt is post partum x 4 days from uncomplicated vaginal delivery.    -Confirmed w/ RN that the patient is NOT currently breastfeeding.  -Relayed to RN that Pt should be educated to abstain from breastfeeding (as aminoglycoside pulse Tx is not compatible w/ breastfeeding), until therapy is complete. Pharmacy to follow daily and will make changes to dose and/or frequency based on clinical status. Maya Lane, Pharm. D.   Clinical Pharmacist  [phone]

## 2017-12-14 NOTE — ED NOTES
Pt appears improved at this time. Pt no longer shivering and states she is feeling better. Provider at bedside to update pt on plan of care.

## 2017-12-14 NOTE — ED NOTES
Pt reporting increased pain (10/10) and is noted to be shivering. Pt assisted to restroom. Orders placed for pain medication by provider. Will continue to monitor.

## 2017-12-14 NOTE — PROGRESS NOTES
Readmission Risk Assessment: Low Risk and [facility] patients    RRAT Score: 1 - 12    Initial Assessment:Emergency Contact: chart reviewed met with patient in ED, was being seen for abd pain,pt recently discharge after vaginal delivery on 12-10-17,diagnosed with endometritis, patient father was at bedside,infant at home with ,pt admitted to medical unit,cm will remian available for d/c planning if needed,will cont to review chart. Pertinent Medical Hx: see chart  PCP/Specialists: Community Services: OBGYN    DME:     Low Risk Care Transition Plan:  1. Evaluate for [facility] or [address] coordination of resources  2. Involve patient/caregiver in assessment, planning, education and implement of intervention. 3. CM daily patient care huddles/interdisciplinary rounds. 4. PCP/Specialist appointment within 7 - 10 days made prior to discharge. 5. Facilitate transportation and logistics for follow-up appointments. 6. Handoff to [address] Nurse Navigator or PCP practice.

## 2017-12-15 VITALS
BODY MASS INDEX: 34.48 KG/M2 | RESPIRATION RATE: 18 BRPM | SYSTOLIC BLOOD PRESSURE: 134 MMHG | TEMPERATURE: 99 F | HEART RATE: 79 BPM | HEIGHT: 62 IN | DIASTOLIC BLOOD PRESSURE: 81 MMHG | WEIGHT: 187.4 LBS | OXYGEN SATURATION: 100 %

## 2017-12-15 LAB
ANION GAP SERPL CALC-SCNC: 6 MMOL/L (ref 3–18)
BUN SERPL-MCNC: 9 MG/DL (ref 7–18)
BUN/CREAT SERPL: 13 (ref 12–20)
CALCIUM SERPL-MCNC: 8 MG/DL (ref 8.5–10.1)
CHLORIDE SERPL-SCNC: 109 MMOL/L (ref 100–108)
CO2 SERPL-SCNC: 27 MMOL/L (ref 21–32)
CREAT SERPL-MCNC: 0.7 MG/DL (ref 0.6–1.3)
DATE LAST DOSE: NORMAL
GENTAMICIN TROUGH SERPL-MCNC: 1.2 UG/ML
GLUCOSE SERPL-MCNC: 97 MG/DL (ref 74–99)
POTASSIUM SERPL-SCNC: 3.6 MMOL/L (ref 3.5–5.5)
REPORTED DOSE,DOSE: NORMAL UNITS
REPORTED DOSE/TIME,TMG: 1300
SODIUM SERPL-SCNC: 142 MMOL/L (ref 136–145)

## 2017-12-15 PROCEDURE — 80170 ASSAY OF GENTAMICIN: CPT | Performed by: PHYSICIAN ASSISTANT

## 2017-12-15 PROCEDURE — 74011000258 HC RX REV CODE- 258: Performed by: PHYSICIAN ASSISTANT

## 2017-12-15 PROCEDURE — 74011250637 HC RX REV CODE- 250/637: Performed by: ADVANCED PRACTICE MIDWIFE

## 2017-12-15 PROCEDURE — 36415 COLL VENOUS BLD VENIPUNCTURE: CPT | Performed by: PHYSICIAN ASSISTANT

## 2017-12-15 PROCEDURE — 74011250636 HC RX REV CODE- 250/636: Performed by: PHYSICIAN ASSISTANT

## 2017-12-15 PROCEDURE — 80048 BASIC METABOLIC PNL TOTAL CA: CPT | Performed by: PHYSICIAN ASSISTANT

## 2017-12-15 RX ORDER — AMOXICILLIN AND CLAVULANATE POTASSIUM 875; 125 MG/1; MG/1
1 TABLET, FILM COATED ORAL 2 TIMES DAILY
Qty: 14 TAB | Refills: 0 | Status: SHIPPED | OUTPATIENT
Start: 2017-12-15

## 2017-12-15 RX ADMIN — SODIUM CHLORIDE 1.5 G: 900 INJECTION, SOLUTION INTRAVENOUS at 05:24

## 2017-12-15 RX ADMIN — OXYCODONE HYDROCHLORIDE AND ACETAMINOPHEN 1 TABLET: 5; 325 TABLET ORAL at 05:29

## 2017-12-15 NOTE — DISCHARGE SUMMARY
Gynecology Discharge Summary     Name: Nena Weber MRN: [id number]  SSN: [id number]    YOB: 1990  Age: 33 y.o. Sex: female      Admit Date: 12/14/2017    Discharge Date: 12/15/2017      Admitting Physician: Deja Acuña MD     Discharge Physician: Briseyda Greenwood CNM     * Admission Diagnoses: Endometritis    * Discharge Diagnoses:   Hospital Problems as of 12/15/2017  Date Reviewed: 12/11/2017          Codes Class Noted - Resolved POA    Endometritis ICD-10-CM: N71.9  ICD-9-CM: 615.9  12/14/2017 - Present Unknown               * Procedures: none    Consults: None    * Discharge Condition: stable    * Hospital Course:   Normal hospital course for this diagnosis. * Discharge Disposition: Home    Discharge Medications:   Current Discharge Medication List      START taking these medications    Details   amoxicillin-clavulanate (AUGMENTIN) 875-125 mg per tablet Take 1 Tab by mouth two (2) times a day. Indications: endometritis  Qty: 14 Tab, Refills: 0         CONTINUE these medications which have NOT CHANGED    Details   oxyCODONE-acetaminophen (PERCOCET) 5-325 mg per tablet Take 1 Tab by mouth every six (6) hours as needed. Max Daily Amount: 4 Tabs. Indications: Pain  Qty: 20 Tab, Refills: 0      PNV with Ca No.65-Iron Poly-FA 60 mg iron-1 mg cap Take 1 Tab by mouth daily. Indications: PREGNANCY              * Follow-up Care/Patient Instructions: Activity: No sex, douching, or tampons for 6 weeks or as directed by your physician. No heavy lifting for 6 weeks. No driving while taking pain medication.   Diet: Resume pre-hospital diet      Follow-up Information     Follow up With Details [address] MD   [address] [address]  [address] MD   [address]  [address] [facility]  [place] [address] [facility]      Deja Acuña MD In 1 week  [facility]  [phone] Signed By:  Angela Brown CNM     December 15, 2017

## 2017-12-15 NOTE — PROGRESS NOTES
Progress Note                               Patient: Alexis Ho MRN: [id number]  SSN: [id number]    YOB: 1990  Age: 33 y.o. Sex: female      Postpartum Day Number 5    Subjective:     Pt represent to hospital yesterday with fever, abdominal and flank pain. She reports \"feeling much better\". Her pain is resolving. She has been afebrile x 22 hrs. Patient reports normal lochia. Objective:     Patient Vitals for the past 18 hrs:   Temp Pulse Resp BP SpO2   12/15/17 0908 99 °F (37.2 °C) 79 18 134/81 100 %   12/15/17 0414 98.4 °F (36.9 °C) 73 18 136/71 100 %   17 2344 98.4 °F (36.9 °C) 83 18 119/56 100 %   17 2018 97.8 °F (36.6 °C) 91 18 120/67 100 %        Temp (24hrs), Av.5 °F (37.5 °C), Min:97.8 °F (36.6 °C), Max:102.1 °F (38.9 °C)      Physical Exam:    Patient without distress. Heart: Regular rate and rhythm  Lung: clear to auscultation throughout lung fields, no wheezes, no rales, no rhonchi and normal respiratory effort  Abdomen: soft, nontender  Fundus: firm and non tender  Lower Extremities:  - Edema No    Lab/Data Review: All lab results for the last 24 hours reviewed. Assessment and Plan:     Postpartum course complicated by endometritis. Discharge home today on PO antibiotics. F/u in 1 week.      Signed By: Angela Brown CNM     December 15, 2017

## 2017-12-15 NOTE — PROGRESS NOTES
Noted pt transfer to 3 S. CM has reviewed previous CM notes. No plan of care needs have been identified at this time. CM remains available to assist.      Care Management Interventions  PCP Verified by CM: Yes  Mode of Transport at Discharge:  Other (see comment) (spouse)  Transition of Care Consult (CM Consult): Discharge Planning  Health Maintenance Reviewed: Yes  Current Support Network: Lives with Spouse  Confirm Follow Up Transport: Self  Plan discussed with Pt/Family/Caregiver: Yes  Discharge Location  Discharge Placement: Home with family assistance

## 2017-12-15 NOTE — PROGRESS NOTES
1930 - Bedside report received from [facility]. Patient in bed. Pain 0/10.     2250 - Patient in bed at this time. IV to [address]  intact and patent. + CMS. Pt A & O x 4. LS clear, on RA. Abdomen soft, NT and ND. + BS to all 4 quadrants. Denies nausea. Pain 0/10. Call light within reach. 0001-Medications given. Potential side effects explained to patient, patient verbalizes understanding, opportunities for questions provided. Patient stable, No apparent distress at this time, bed in locked position, call bell and phone within reach. 0530-Medications given. Potential side effects explained to patient, patient verbalizes understanding, opportunities for questions provided. Patient stable, No apparent distress at this time, bed in locked position, call bell and phone within reach. Pt had uneventful shift. Pt ambulated without assistance. Pain remained well-controlled with medication. No issues/concerns at this time.  Call bell within reach

## 2017-12-15 NOTE — DISCHARGE INSTRUCTIONS
Postpartum Endometritis: Care Instructions  Your Care Instructions    Postpartum endometritis is an infection of the lining of the uterus after you give birth. It is treated with antibiotics. It is very important to treat this problem. If you don't, you can get a more serious infection. It could cause problems, such as scars on the pelvic organs. Or it could prevent you from having more children. Follow-up care is a key part of your treatment and safety. Be sure to make and go to all appointments, and call your doctor if you are having problems. It's also a good idea to know your test results and keep a list of the medicines you take. How can you care for yourself at home? · Take your antibiotics as directed. Do not stop taking them just because you feel better. You need to take the full course of antibiotics. · Rest until you feel better. · Take an over-the-counter pain medicine, such as acetaminophen (Tylenol) or ibuprofen (Advil, Motrin). Read and follow all instructions on the label. · Do not take two or more pain medicines at the same time unless the doctor told you to. Many pain medicines have acetaminophen, which is Tylenol. Too much acetaminophen (Tylenol) can be harmful. · If you have belly pain, use a hot water bottle. Or you can use a heating pad set on low. Put a thin cloth between the heating pad and your skin. · Do not have sex or use tampons until your doctor says it's safe. Use pads instead of tampons. When should you call for help? Call 911 anytime you think you may need emergency care. For example, call if:  · You passed out (lost consciousness). Call your doctor now or seek immediate medical care if:  · You have severe vaginal bleeding. · You are dizzy or lightheaded, or you feel like you may faint. · You have a fever. · You have new or worse pain in your belly or pelvis.   Watch closely for changes in your health, and be sure to contact your doctor if:  · Your vaginal bleeding seems to be getting heavier. · You have new or worse vaginal discharge. · You feel sad, anxious, or hopeless for more than a few days. · You do not get better as expected. Where can you learn more? Go to http://sharri-fredo.info/. Enter N558 in the search box to learn more about \"Postpartum Endometritis: Care Instructions. \"  Current as of: March 16, 2017  Content Version: 11.4  © 3951-2079 [employer]. Care instructions adapted under license by [employer] (which disclaims liability or warranty for this information). If you have questions about a medical condition or this instruction, always ask your healthcare professional. [address] any warranty or liability for your use of this information.

## 2017-12-15 NOTE — ROUTINE PROCESS
Dual AVS reviewed with Brigette Hancock RN. All medications reviewed individually with patient. Opportunities for questions and concerns provided. Patient discharged via (mode of transport ie. Car, ambulance or air transport) car  Patient's arm band appropriately discarded.

## 2017-12-16 LAB
BACTERIA SPEC CULT: NORMAL
SERVICE CMNT-IMP: NORMAL